# Patient Record
Sex: FEMALE | Race: WHITE | NOT HISPANIC OR LATINO | URBAN - METROPOLITAN AREA
[De-identification: names, ages, dates, MRNs, and addresses within clinical notes are randomized per-mention and may not be internally consistent; named-entity substitution may affect disease eponyms.]

---

## 2017-01-05 ENCOUNTER — OUTPATIENT (OUTPATIENT)
Dept: OUTPATIENT SERVICES | Facility: HOSPITAL | Age: 52
LOS: 1 days | Discharge: HOME | End: 2017-01-05

## 2017-06-27 DIAGNOSIS — G57.62 LESION OF PLANTAR NERVE, LEFT LOWER LIMB: ICD-10-CM

## 2017-06-27 DIAGNOSIS — M20.42 OTHER HAMMER TOE(S) (ACQUIRED), LEFT FOOT: ICD-10-CM

## 2017-10-23 ENCOUNTER — OUTPATIENT (OUTPATIENT)
Dept: OUTPATIENT SERVICES | Facility: HOSPITAL | Age: 52
LOS: 1 days | Discharge: HOME | End: 2017-10-23

## 2017-10-23 DIAGNOSIS — M79.672 PAIN IN LEFT FOOT: ICD-10-CM

## 2017-12-12 ENCOUNTER — OUTPATIENT (OUTPATIENT)
Dept: OUTPATIENT SERVICES | Facility: HOSPITAL | Age: 52
LOS: 1 days | Discharge: HOME | End: 2017-12-12

## 2017-12-12 DIAGNOSIS — Z01.818 ENCOUNTER FOR OTHER PREPROCEDURAL EXAMINATION: ICD-10-CM

## 2017-12-21 ENCOUNTER — OUTPATIENT (OUTPATIENT)
Dept: OUTPATIENT SERVICES | Facility: HOSPITAL | Age: 52
LOS: 1 days | Discharge: HOME | End: 2017-12-21

## 2017-12-21 DIAGNOSIS — M79.7 FIBROMYALGIA: ICD-10-CM

## 2017-12-21 DIAGNOSIS — M20.42 OTHER HAMMER TOE(S) (ACQUIRED), LEFT FOOT: ICD-10-CM

## 2017-12-21 DIAGNOSIS — F31.9 BIPOLAR DISORDER, UNSPECIFIED: ICD-10-CM

## 2017-12-21 DIAGNOSIS — F17.210 NICOTINE DEPENDENCE, CIGARETTES, UNCOMPLICATED: ICD-10-CM

## 2018-01-02 PROBLEM — Z00.00 ENCOUNTER FOR PREVENTIVE HEALTH EXAMINATION: Status: ACTIVE | Noted: 2018-01-02

## 2018-01-30 ENCOUNTER — OUTPATIENT (OUTPATIENT)
Dept: OUTPATIENT SERVICES | Facility: HOSPITAL | Age: 53
LOS: 1 days | Discharge: HOME | End: 2018-01-30

## 2018-01-30 DIAGNOSIS — M79.672 PAIN IN LEFT FOOT: ICD-10-CM

## 2018-01-30 DIAGNOSIS — M79.641 PAIN IN RIGHT HAND: ICD-10-CM

## 2018-03-07 ENCOUNTER — APPOINTMENT (OUTPATIENT)
Dept: PLASTIC SURGERY | Facility: CLINIC | Age: 53
End: 2018-03-07

## 2019-06-03 ENCOUNTER — EMERGENCY (EMERGENCY)
Facility: HOSPITAL | Age: 54
LOS: 0 days | Discharge: HOME | End: 2019-06-04
Attending: EMERGENCY MEDICINE | Admitting: EMERGENCY MEDICINE
Payer: MEDICAID

## 2019-06-03 VITALS
HEART RATE: 82 BPM | DIASTOLIC BLOOD PRESSURE: 64 MMHG | OXYGEN SATURATION: 98 % | SYSTOLIC BLOOD PRESSURE: 130 MMHG | RESPIRATION RATE: 18 BRPM | WEIGHT: 154.98 LBS | HEIGHT: 67 IN | TEMPERATURE: 96 F

## 2019-06-03 DIAGNOSIS — M77.9 ENTHESOPATHY, UNSPECIFIED: ICD-10-CM

## 2019-06-03 DIAGNOSIS — M79.646 PAIN IN UNSPECIFIED FINGER(S): ICD-10-CM

## 2019-06-03 PROCEDURE — 73130 X-RAY EXAM OF HAND: CPT | Mod: 26,RT

## 2019-06-03 PROCEDURE — 99283 EMERGENCY DEPT VISIT LOW MDM: CPT | Mod: 25
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
## 2019-06-03 NOTE — ED ADULT TRIAGE NOTE - BP NONINVASIVE DIASTOLIC (MM HG)
patietn upset because he is in pain and has been waiting patiently.  Walked out but brought back by hospital security services.  CT called and ready for patient.  Will treat with pain meds.
66

## 2019-06-04 NOTE — ED PROVIDER NOTE - CLINICAL SUMMARY MEDICAL DECISION MAKING FREE TEXT BOX
54 female here for  right index digit pain had screening exam and labs, no acute findings, will discharge with outpatient management.

## 2019-06-04 NOTE — ED PROVIDER NOTE - PHYSICAL EXAMINATION
CONST: Well appearing in NAD  EYES: PERRL, EOMI, Sclera and conjunctiva clear.   ENT: No nasal discharge. Oropharynx normal appearing, no erythema or exudates. No abscess or swelling. Uvula midline.   NECK: Non-tender, no meningeal signs. normal ROM. supple   CARD: S1 S2; No jvd  RESP: Equal BS B/L, No wheezes, rhonchi or rales. No distress  GI: Soft, non-tender, non-distended. no cva tenderness. normal BS  MS: R 2nd and 3rd MCP tenderness and swelling. Normal ROM in all extremities. pulses 2 +.   SKIN: Warm, dry, no acute rashes. Good turgor  NEURO: A&Ox3, No focal deficits. Strength 5/5 with no sensory deficits.

## 2019-06-04 NOTE — ED PROVIDER NOTE - NS ED ROS FT
Constitutional: (-) fever  Eyes/ENT: (-) blurry vision, (-) epistaxis  Cardiovascular: (-) chest pain, (-) syncope  Respiratory: (-) cough, (-) shortness of breath  Gastrointestinal: (-) vomiting, (-) diarrhea  Musculoskeletal: (+) joint pain, (-) neck pain, (-) back pain  Integumentary: (-) rash, (-) edema  Neurological: (-) headache, (-) altered mental status  Psychiatric: (-) hallucinations  Allergic/Immunologic: (-) pruritus

## 2019-06-04 NOTE — ED PROVIDER NOTE - PROGRESS NOTE DETAILS
Results d/w patient and family and copies of results provided.  Pt instructed to return if any worsening symptoms or concerns.  They verbalize understanding. I have personally evaluated the patient myself and agree with fellow's plan.
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
## 2019-10-06 ENCOUNTER — EMERGENCY (EMERGENCY)
Facility: HOSPITAL | Age: 54
LOS: 0 days | Discharge: HOME | End: 2019-10-06
Attending: EMERGENCY MEDICINE | Admitting: EMERGENCY MEDICINE
Payer: MEDICAID

## 2019-10-06 VITALS
DIASTOLIC BLOOD PRESSURE: 80 MMHG | TEMPERATURE: 96 F | RESPIRATION RATE: 18 BRPM | HEART RATE: 71 BPM | SYSTOLIC BLOOD PRESSURE: 137 MMHG | OXYGEN SATURATION: 99 %

## 2019-10-06 VITALS — HEIGHT: 67 IN | WEIGHT: 160.06 LBS

## 2019-10-06 DIAGNOSIS — R21 RASH AND OTHER NONSPECIFIC SKIN ERUPTION: ICD-10-CM

## 2019-10-06 DIAGNOSIS — B02.9 ZOSTER WITHOUT COMPLICATIONS: ICD-10-CM

## 2019-10-06 DIAGNOSIS — Z98.890 OTHER SPECIFIED POSTPROCEDURAL STATES: Chronic | ICD-10-CM

## 2019-10-06 LAB
ANION GAP SERPL CALC-SCNC: 11 MMOL/L — SIGNIFICANT CHANGE UP (ref 7–14)
BUN SERPL-MCNC: 16 MG/DL — SIGNIFICANT CHANGE UP (ref 10–20)
CALCIUM SERPL-MCNC: 9.4 MG/DL — SIGNIFICANT CHANGE UP (ref 8.5–10.1)
CHLORIDE SERPL-SCNC: 104 MMOL/L — SIGNIFICANT CHANGE UP (ref 98–110)
CO2 SERPL-SCNC: 24 MMOL/L — SIGNIFICANT CHANGE UP (ref 17–32)
CREAT SERPL-MCNC: 1 MG/DL — SIGNIFICANT CHANGE UP (ref 0.7–1.5)
GLUCOSE SERPL-MCNC: 93 MG/DL — SIGNIFICANT CHANGE UP (ref 70–99)
POTASSIUM SERPL-MCNC: 4.6 MMOL/L — SIGNIFICANT CHANGE UP (ref 3.5–5)
POTASSIUM SERPL-SCNC: 4.6 MMOL/L — SIGNIFICANT CHANGE UP (ref 3.5–5)
SODIUM SERPL-SCNC: 139 MMOL/L — SIGNIFICANT CHANGE UP (ref 135–146)

## 2019-10-06 PROCEDURE — 99284 EMERGENCY DEPT VISIT MOD MDM: CPT

## 2019-10-06 RX ORDER — VALACYCLOVIR 500 MG/1
1 TABLET, FILM COATED ORAL
Qty: 30 | Refills: 0
Start: 2019-10-06 | End: 2019-10-15

## 2019-10-06 NOTE — ED PROVIDER NOTE - PHYSICAL EXAMINATION
Gen: NAD  Head: NCAT  HEENT: PERRL, oral mucosa moist, normal conjunctiva, oropharynx clear without exudate or erythema  Lung: CTAB, no respiratory distress, no wheezing, rales, rhonchi  CV: normal s1/s2, rrr, Normal perfusion, pulses 2+ throughout  Abd: soft, NTND, no CVA tenderness  Genitourinary: no pelvic tenderness  MSK: No edema, no visible deformities, full range of motion in all 4 extremities  Neuro: AOx3  Skin: multiple 3-4cm circular erythematous blanchable papulae running along L T12 dermatome    Psych: normal affect

## 2019-10-06 NOTE — ED PROVIDER NOTE - NSFOLLOWUPINSTRUCTIONS_ED_ALL_ED_FT
Please follow up with your primary care physician within 24-72 hours and return immediately if symptoms worsen.    SHINGLES    Shingles, which is also known as herpes zoster, is an infection that causes a painful skin rash and fluid-filled blisters. Shingles is not related to genital herpes, which is a sexually transmitted infection.     Shingles only develops in people who:    Have had chickenpox.  Have received the chickenpox vaccine. (This is rare.)    CAUSES  Shingles is caused by varicella-zoster virus (VZV). This is the same virus that causes chickenpox. After exposure to VZV, the virus stays in the body in an inactive (dormant) state. Shingles develops if the virus reactivates. This can happen many years after the initial exposure to VZV. It is not known what causes this virus to reactivate.    RISK FACTORS  People who have had chickenpox or received the chickenpox vaccine are at risk for shingles. Infection is more common in people who:    Are older than age 50.  Have a weakened defense (immune) system, such as those with HIV, AIDS, or cancer.  Are taking medicines that weaken the immune system, such as transplant medicines.  Are under great stress.    SYMPTOMS  Early symptoms of this condition include itching, tingling, and pain in an area on your skin. Pain may be described as burning, stabbing, or throbbing.    A few days or weeks after symptoms start, a painful red rash appears, usually on one side of the body in a bandlike or beltlike pattern. The rash eventually turns into fluid-filled blisters that break open, scab over, and dry up in about 2–3 weeks.    At any time during the infection, you may also develop:    A fever.  Chills.  A headache.  An upset stomach.    DIAGNOSIS  This condition is diagnosed with a skin exam. Sometimes, skin or fluid samples are taken from the blisters before a diagnosis is made. These samples are examined under a microscope or sent to a lab for testing.    TREATMENT  There is no specific cure for this condition. Your health care provider will probably prescribe medicines to help you manage pain, recover more quickly, and avoid long-term problems. Medicines may include:    Antiviral drugs.  Anti-inflammatory drugs.  Pain medicines.    If the area involved is on your face, you may be referred to a specialist, such as an eye doctor (ophthalmologist) or an ear, nose, and throat (ENT) doctor to help you avoid eye problems, chronic pain, or disability.    HOME CARE INSTRUCTIONS  Medicines    Take medicines only as directed by your health care provider.  Apply an anti-itch or numbing cream to the affected area as directed by your health care provider.    Blister and Rash Care    Take a cool bath or apply cool compresses to the area of the rash or blisters as directed by your health care provider. This may help with pain and itching.  Keep your rash covered with a loose bandage (dressing). Wear loose-fitting clothing to help ease the pain of material rubbing against the rash.  Keep your rash and blisters clean with mild soap and cool water or as directed by your health care provider.  Check your rash every day for signs of infection. These include redness, swelling, and pain that lasts or increases.  Do not pick your blisters.  Do not scratch your rash.    General Instructions    Rest as directed by your health care provider.  Keep all follow-up visits as directed by your health care provider. This is important.  Until your blisters scab over, your infection can cause chickenpox in people who have never had it or been vaccinated against it. To prevent this from happening, avoid contact with other people, especially:  Babies.  Pregnant women.  Children who have eczema.  Elderly people who have transplants.  People who have chronic illnesses, such as leukemia or AIDS.    SEEK MEDICAL CARE IF:  Your pain is not relieved with prescribed medicines.  Your pain does not get better after the rash heals.  Your rash looks infected. Signs of infection include redness, swelling, and pain that lasts or increases.    SEEK IMMEDIATE MEDICAL CARE IF:  The rash is on your face or nose.  You have facial pain, pain around your eye area, or loss of feeling on one side of your face.  You have ear pain or you have ringing in your ear.  You have loss of taste.  Your condition gets worse.    ADDITIONAL NOTES AND INSTRUCTIONS    Please follow up with your Primary MD in 24-48 hr.  Seek immediate medical care for any new/worsening signs or symptoms.

## 2019-10-06 NOTE — ED PROVIDER NOTE - PATIENT PORTAL LINK FT
You can access the FollowMyHealth Patient Portal offered by [facility] by registering at the following website: [email or web address]. By joining [employer]’s FollowMyHealth portal, you will also be able to view your health information using other applications (apps) compatible with our system.

## 2019-10-06 NOTE — ED ADULT NURSE NOTE - NSIMPLEMENTINTERV_GEN_ALL_ED
Implemented All Universal Safety Interventions:  [place] to call system. Call bell, personal items and telephone within reach. Instruct patient to call for assistance. Room bathroom lighting operational. Non-slip footwear when patient is off stretcher. Physically safe environment: no spills, clutter or unnecessary equipment. Stretcher in lowest position, wheels locked, appropriate side rails in place.

## 2019-10-06 NOTE — ED PROVIDER NOTE - ATTENDING CONTRIBUTION TO CARE
55 yo female with no sig pmhx here for eval of rash. pt c/o left back rash that is painfull. no fever, chills, insect bite. on exam pt with raised papular rash to left flank/back in different stages, doesn't cross midline. lesions are not warm or red.

## 2019-10-06 NOTE — ED PROVIDER NOTE - OBJECTIVE STATEMENT
55 yo female with no pertinent pmh presents with rash to her L side. she noted the rash when she woke this morning and states it to be pruritic and painful. no one else in her house has a similar rash and she does note to changed detergent recently. she has felt more tired than usual but denies any other symptoms including fevers, chill, headache, recent illness/travel, cough, abdominal pain, chest pain, or SOB.
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
## 2020-01-28 ENCOUNTER — EMERGENCY (EMERGENCY)
Facility: HOSPITAL | Age: 55
LOS: 0 days | Discharge: HOME | End: 2020-01-28
Attending: EMERGENCY MEDICINE | Admitting: EMERGENCY MEDICINE
Payer: MEDICAID

## 2020-01-28 VITALS
DIASTOLIC BLOOD PRESSURE: 82 MMHG | OXYGEN SATURATION: 95 % | SYSTOLIC BLOOD PRESSURE: 129 MMHG | HEART RATE: 82 BPM | WEIGHT: 169.98 LBS | TEMPERATURE: 96 F | RESPIRATION RATE: 16 BRPM

## 2020-01-28 DIAGNOSIS — Z98.890 OTHER SPECIFIED POSTPROCEDURAL STATES: ICD-10-CM

## 2020-01-28 DIAGNOSIS — J32.9 CHRONIC SINUSITIS, UNSPECIFIED: ICD-10-CM

## 2020-01-28 DIAGNOSIS — Z98.890 OTHER SPECIFIED POSTPROCEDURAL STATES: Chronic | ICD-10-CM

## 2020-01-28 DIAGNOSIS — F17.210 NICOTINE DEPENDENCE, CIGARETTES, UNCOMPLICATED: ICD-10-CM

## 2020-01-28 DIAGNOSIS — R05 COUGH: ICD-10-CM

## 2020-01-28 PROBLEM — F31.9 BIPOLAR DISORDER, UNSPECIFIED: Chronic | Status: ACTIVE | Noted: 2019-10-06

## 2020-01-28 PROBLEM — F90.9 ATTENTION-DEFICIT HYPERACTIVITY DISORDER, UNSPECIFIED TYPE: Chronic | Status: ACTIVE | Noted: 2019-10-06

## 2020-01-28 PROCEDURE — 71046 X-RAY EXAM CHEST 2 VIEWS: CPT | Mod: 26

## 2020-01-28 PROCEDURE — 99285 EMERGENCY DEPT VISIT HI MDM: CPT

## 2020-01-28 PROCEDURE — 71045 X-RAY EXAM CHEST 1 VIEW: CPT | Mod: 26,59

## 2020-01-28 RX ORDER — IPRATROPIUM/ALBUTEROL SULFATE 18-103MCG
3 AEROSOL WITH ADAPTER (GRAM) INHALATION ONCE
Refills: 0 | Status: COMPLETED | OUTPATIENT
Start: 2020-01-28 | End: 2020-01-28

## 2020-01-28 RX ORDER — DEXAMETHASONE 0.5 MG/5ML
10 ELIXIR ORAL ONCE
Refills: 0 | Status: COMPLETED | OUTPATIENT
Start: 2020-01-28 | End: 2020-01-28

## 2020-01-28 RX ADMIN — Medication 10 MILLIGRAM(S): at 10:12

## 2020-01-28 RX ADMIN — Medication 3 MILLILITER(S): at 11:02

## 2020-01-28 RX ADMIN — Medication 3 MILLILITER(S): at 10:16

## 2020-01-28 RX ADMIN — Medication 3 MILLILITER(S): at 10:12

## 2020-01-28 NOTE — ED PROVIDER NOTE - OBJECTIVE STATEMENT
Patient is a 53 yo f who presents for evaluation of worsening cough, could congestion over the past several days she denies any other headache, neck pain, chest pain or exertional sob, she denies any diaphoresis.  She denies any vomiting.  she is a smoker.

## 2020-01-28 NOTE — ED PROVIDER NOTE - ATTENDING CONTRIBUTION TO CARE
I was present for and supervised the key and critical aspects of the procedures performed during the care of the patient.  Patient is a 53 yo f who presents for evaluation of worsening cough, could congestion over the past several days she denies any other headache, neck pain, chest pain or exertional sob, she denies any diaphoresis.  She denies any vomiting.  she is a smoker.    On physical exam the patient is nc/at perrla eomi oropharynx clear cta b/l, rrr s1s2 noted ext  from   patient given albuterol, we initiated po antibiotics.

## 2020-01-28 NOTE — ED PROVIDER NOTE - PATIENT PORTAL LINK FT
You can access the FollowMyHealth Patient Portal offered by [facility] by registering at the following website: [email or web address]. By joining [employer]’s FollowMyHealth portal, you will also be able to view your health information using other applications (apps) compatible with our system.

## 2020-01-28 NOTE — ED ADULT TRIAGE NOTE - CHIEF COMPLAINT QUOTE
"I had the flu eight days ago. Since then my sinuses have been bothering me. I have a cough with green mucous. I have an ache to my upper back and my glands are swollen as well."

## 2020-01-28 NOTE — ED PROVIDER NOTE - CLINICAL SUMMARY MEDICAL DECISION MAKING FREE TEXT BOX
Patient has improved at this time I will discharge we initiated po antibiotics we discussed indications to return at this time

## 2020-03-11 NOTE — ED PROVIDER NOTE - NS ED ROS FT
Constitutional: (-) fever  Eyes/ENT: (-) blurry vision, (-) epistaxis, (-) visual changes   Cardiovascular: (-) chest pain, (-) syncope  Respiratory: (-) cough, (-) shortness of breath  Gastrointestinal: (-) vomiting, (-) diarrhea  Genitourinary: (-) dysuria, (-) hesitancy, (-) frequency   Musculoskeletal: (-) neck pain, (-) back pain, (-) joint pain  Integumentary: (+) rash, (-) edema  Neurological: (-) headache, (-) altered mental status  Allergic/Immunologic: (-) pruritus clear

## 2021-04-06 ENCOUNTER — APPOINTMENT (OUTPATIENT)
Dept: PLASTIC SURGERY | Facility: CLINIC | Age: 56
End: 2021-04-06
Payer: COMMERCIAL

## 2021-04-06 DIAGNOSIS — Z87.891 PERSONAL HISTORY OF NICOTINE DEPENDENCE: ICD-10-CM

## 2021-04-06 PROCEDURE — 99072 ADDL SUPL MATRL&STAF TM PHE: CPT

## 2021-04-06 RX ORDER — MENTHOL/CAMPHOR 0.5 %-0.5%
1000 LOTION (ML) TOPICAL
Refills: 0 | Status: ACTIVE | COMMUNITY

## 2021-04-06 RX ORDER — DEXTROAMPHETAMINE SACCHARATE, AMPHETAMINE ASPARTATE, DEXTROAMPHETAMINE SULFATE, AND AMPHETAMINE SULFATE 2.5; 2.5; 2.5; 2.5 MG/1; MG/1; MG/1; MG/1
10 TABLET ORAL
Refills: 0 | Status: ACTIVE | COMMUNITY

## 2021-04-06 RX ORDER — MULTIVIT-MIN/IRON/FOLIC ACID/K 18-600-40
CAPSULE ORAL
Refills: 0 | Status: ACTIVE | COMMUNITY

## 2021-04-06 RX ORDER — LISDEXAMFETAMINE DIMESYLATE 10 MG/1
CAPSULE ORAL
Refills: 0 | Status: ACTIVE | COMMUNITY

## 2021-04-06 RX ORDER — IBUPROFEN 800 MG/1
800 TABLET, FILM COATED ORAL
Refills: 0 | Status: ACTIVE | COMMUNITY

## 2021-04-06 RX ORDER — HYDROCORTISONE ACETATE 0.5 %
CREAM (GRAM) TOPICAL
Refills: 0 | Status: ACTIVE | COMMUNITY

## 2021-04-06 RX ORDER — BUPROPION HYDROCHLORIDE 75 MG/1
TABLET, FILM COATED ORAL
Refills: 0 | Status: ACTIVE | COMMUNITY

## 2022-04-26 ENCOUNTER — APPOINTMENT (OUTPATIENT)
Dept: PLASTIC SURGERY | Facility: CLINIC | Age: 57
End: 2022-04-26
Payer: MEDICAID

## 2022-04-26 PROCEDURE — 99203 OFFICE O/P NEW LOW 30 MIN: CPT

## 2022-04-26 NOTE — HISTORY OF PRESENT ILLNESS
[FreeTextEntry1] : 56 yr old woman nonsmoker nondiabetic\par \par prior TUBA approx 13 yrs ago (as well as prior TUBA also approx 13 yrs ago) by [facility]\par has deflation of right breast implant first noticed approx one month prior\par \par no personal h/o breast dz\par last mammogram 2021--normal at [facility]\par \par 5'7"  160lbs\par wears 34DD bra\par \par desires breast lift, slight downsize, silicon implants

## 2022-04-26 NOTE — PHYSICAL EXAM
[NI] : Normal [Bra Size: _______] : Bra Size: [unfilled] [de-identified] : no masses, prepectoral saline implants w partial deflation on the right grade2/3 ptosis\par breast measurements:  left sternal notch-left nipple 27cm     right sternal notch-right nipple 27.5cm\par IMF-left nipple  10cm      IMF-right nipple 9cm      breast width right 15cm    breast width left 16.5cm

## 2022-04-26 NOTE — ASSESSMENT
[FreeTextEntry1] : Photos taken with patient permission.\par \par Due to COVID 19, pre-visit patient instructions were explained to the patient and their symptoms were checked upon arrival.  \par Masks were used by the health care providers and staff and the examination room was cleaned after the patient visit was completed.\par \par Regarding breast implants, we discussed saline versus silicone implants.  We discussed incision approach and the pros/cons of each incision.  We discussed risk of bleeding, infection, capsular contracture, implant failure, need for additional planned or unplanned surgery on breast, as well as the small risk of implant associated lymphoma.  We discussed the likelihood of additional surgery on the breast over the course of her lifetime.  All ?s answered.\par \par Patient is a good candidate for concurrent breast lift.  Regarding the procedure, we discussed scarring, poor wound healing, keloid and/or hypertrophic scarring, diminished nipple sensation, diminished ability to breast feed (if appropriate), breast asymmetry, dissatisfaction with the outcome, need for additional surgery and possible nipple loss.  All questions were answered and all risks were understood.\par \par Due to COVID 19, pre-visit patient instructions were explained to the patient and their symptoms were checked upon arrival.  \par Masks were used by the health care providers and staff and the examination room was cleaned after the patient visit was completed.\par \par \par PT to provide old op note--she believes 550cc saline implants

## 2022-08-25 ENCOUNTER — EMERGENCY (EMERGENCY)
Facility: HOSPITAL | Age: 57
LOS: 0 days | Discharge: HOME | End: 2022-08-25
Attending: EMERGENCY MEDICINE | Admitting: EMERGENCY MEDICINE

## 2022-08-25 VITALS
HEART RATE: 81 BPM | OXYGEN SATURATION: 99 % | SYSTOLIC BLOOD PRESSURE: 129 MMHG | DIASTOLIC BLOOD PRESSURE: 73 MMHG | HEIGHT: 67 IN | WEIGHT: 149.91 LBS | RESPIRATION RATE: 18 BRPM | TEMPERATURE: 97 F

## 2022-08-25 DIAGNOSIS — Z98.890 OTHER SPECIFIED POSTPROCEDURAL STATES: Chronic | ICD-10-CM

## 2022-08-25 DIAGNOSIS — Z87.39 PERSONAL HISTORY OF OTHER DISEASES OF THE MUSCULOSKELETAL SYSTEM AND CONNECTIVE TISSUE: ICD-10-CM

## 2022-08-25 DIAGNOSIS — S09.90XA UNSPECIFIED INJURY OF HEAD, INITIAL ENCOUNTER: ICD-10-CM

## 2022-08-25 DIAGNOSIS — S42.291A OTHER DISPLACED FRACTURE OF UPPER END OF RIGHT HUMERUS, INITIAL ENCOUNTER FOR CLOSED FRACTURE: ICD-10-CM
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
## 2022-08-25 DIAGNOSIS — M25.511 PAIN IN RIGHT SHOULDER: ICD-10-CM

## 2022-08-25 DIAGNOSIS — F31.9 BIPOLAR DISORDER, UNSPECIFIED: ICD-10-CM

## 2022-08-25 DIAGNOSIS — R22.0 LOCALIZED SWELLING, MASS AND LUMP, HEAD: ICD-10-CM

## 2022-08-25 DIAGNOSIS — S00.83XA CONTUSION OF OTHER PART OF HEAD, INITIAL ENCOUNTER: ICD-10-CM

## 2022-08-25 DIAGNOSIS — F90.9 ATTENTION-DEFICIT HYPERACTIVITY DISORDER, UNSPECIFIED TYPE: ICD-10-CM

## 2022-08-25 DIAGNOSIS — Z87.891 PERSONAL HISTORY OF NICOTINE DEPENDENCE: ICD-10-CM

## 2022-08-25 PROCEDURE — 70450 CT HEAD/BRAIN W/O DYE: CPT | Mod: 26,MA

## 2022-08-25 PROCEDURE — 99284 EMERGENCY DEPT VISIT MOD MDM: CPT

## 2022-08-25 PROCEDURE — 73030 X-RAY EXAM OF SHOULDER: CPT | Mod: 26,RT

## 2022-08-25 RX ORDER — OXYCODONE AND ACETAMINOPHEN 5; 325 MG/1; MG/1
1 TABLET ORAL ONCE
Refills: 0 | Status: DISCONTINUED | OUTPATIENT
Start: 2022-08-25 | End: 2022-08-25

## 2022-08-25 RX ADMIN — OXYCODONE AND ACETAMINOPHEN 1 TABLET(S): 5; 325 TABLET ORAL at 22:08

## 2022-08-25 NOTE — ED PROVIDER NOTE - NSFOLLOWUPINSTRUCTIONS_ED_ALL_ED_FT
You were evaluated for a fall.  You had a CT scan which showed no injury to the brain.  He had an x-ray which showed a proximal humeral head fracture.  You were placed in a sling.  You should follow-up with the orthopedic doctor.  General healing will take 4 to 6 weeks.  If you develop worsening pain, swelling, vomiting, changes in mental status, numbness, or paresthesias in the arm he should return to the ED or follow-up with your primary care doctor

## 2022-08-25 NOTE — ED PROVIDER NOTE - NS ED ATTENDING STATEMENT MOD
This was a shared visit with the ARIAN. I reviewed and verified the documentation and independently performed the documented:

## 2022-08-25 NOTE — ED PROVIDER NOTE - CARE PLAN
1 Principal Discharge DX:	Closed head injury  Secondary Diagnosis:	Traumatic injury of shoulder with fracture of humerus

## 2022-08-25 NOTE — ED PROVIDER NOTE - OBJECTIVE STATEMENT
56 yo female hx of ADHD/Bipolar present c/o right shoulder present complaint of right shoulder pain and swelling and right forehead swelling status post fall during roller skating.  Reports she fell into a concrete wall doing roller skating.  Pain to right shoulder wrist with movement.  Denies LOC.  Denies neck and lower back pain.  Further denies chest pain, shortness of breath, abdominal pain, nausea and vomiting.  Denies other extremity pains also.

## 2022-08-25 NOTE — ED PROVIDER NOTE - CLINICAL SUMMARY MEDICAL DECISION MAKING FREE TEXT BOX
Patient evaluated for fall.  CT scan obtained which was negative for acute intracranial pathology.  X-ray was obtained which showed a proximal humeral head fracture on the right.  Patient was placed in a sling with follow-up with Ortho as an outpatient

## 2022-08-25 NOTE — ED PROVIDER NOTE - PATIENT PORTAL LINK FT
You can access the FollowMyHealth Patient Portal offered by [facility] by registering at the following website: [email or web address]. By joining [employer]’s FollowMyHealth portal, you will also be able to view your health information using other applications (apps) compatible with our system.

## 2022-08-25 NOTE — ED PROVIDER NOTE - CARE PROVIDER_API CALL
Saul Velasco)  [facility]  [address] [place] [address] [place] [address]  Phone: [phone]  Fax: [phone]  Follow Up Time: 7-10 Days

## 2022-08-25 NOTE — ED PROVIDER NOTE - ATTENDING APP SHARED VISIT CONTRIBUTION OF CARE
Patient was rollerskating fell and had a head injury did not lose consciousness no vomiting occurred 45 minutes prior to arrival also injured her right shoulder pain is mainly in the right shoulder with difficulty with movement sharp in nature moderate intensity without any numbness or paresthesias on exam there is a large hematoma to the right frontal scalp pupils are normal cranial nerves are intact right shoulder limited range of motion secondary to pain but distal extremity exam is normal plan will be x-ray of the shoulder and CT head

## 2022-08-25 NOTE — ED PROVIDER NOTE - NS ED ROS FT
Constitutional: no fever, chills, no recent weight loss, change in appetite or malaise  Cardiac: No chest pain, SOB or edema.  Respiratory: No cough or respiratory distress  GI: No nausea, vomiting, diarrhea or abdominal pain.  MS: See HPI  Neuro: No headache or weakness. No LOC.  Skin: See HPI  Endocrine: No history of thyroid disease or diabetes.

## 2022-09-01 ENCOUNTER — APPOINTMENT (OUTPATIENT)
Dept: ORTHOPEDIC SURGERY | Facility: CLINIC | Age: 57
End: 2022-09-01

## 2022-09-01 VITALS — BODY MASS INDEX: 23.54 KG/M2 | WEIGHT: 150 LBS | HEIGHT: 67 IN

## 2022-09-01 DIAGNOSIS — Z56.0 UNEMPLOYMENT, UNSPECIFIED: ICD-10-CM

## 2022-09-01 DIAGNOSIS — F17.200 NICOTINE DEPENDENCE, UNSPECIFIED, UNCOMPLICATED: ICD-10-CM

## 2022-09-01 PROCEDURE — 23600 CLTX PROX HUMRL FX W/O MNPJ: CPT

## 2022-09-01 PROCEDURE — 99203 OFFICE O/P NEW LOW 30 MIN: CPT | Mod: 25,57

## 2022-09-01 SDOH — ECONOMIC STABILITY - INCOME SECURITY: UNEMPLOYMENT, UNSPECIFIED: Z56.0

## 2022-09-01 NOTE — IMAGING
[de-identified] :   Middle-aged woman sits somewhat uncomfortably in my office.  She is annoyed at overweight time but does not appear to be in distress.\par \par Physical examination:\par Right shoulder and upper extremity:  Ecchymosis noted along the proximal medial aspect of her upper arm.  She has no tenderness palpation about the clavicle or acromial arch.  Some tenderness with associated swelling at the level of her fracture.  She is able to fire her deltoid and has normal light sensation axillary nerve distribution.  She is able range her wrist and digits without limitation although she has some dependent edema.  Normal light sensation about her fingers.\par \par Radiographs from [facility] reviewed from August 25th 2022. These demonstrate multi fragmentary comminuted right proximal humerus fracture.

## 2022-09-01 NOTE — HISTORY OF PRESENT ILLNESS
[de-identified] :  57-year-old woman presents to this office for evaluation of a multi fragmentary right proximal humerus fracture sustained as result of a fall on August 21, 2022. Initially seen at [facility] patient was referred to my office for further evaluation.  Patient has no prior history of problems with her right shoulder.  She reports swelling ecchymosis and pain associated with the fracture.  She would like something stronger than ibuprofen for pain relief.  She does also notes some mild GI symptoms associated with her ibuprofen.  She denies any clear sensory complaints.

## 2022-09-01 NOTE — ASSESSMENT
[FreeTextEntry1] :   57-year-old woman presents for initial evaluation of a comminuted right proximal humerus fracture.  I have discussed with her treatment options which include continued non operative management, surgical intervention which bifurcated since 2 either choice for ORIF versus arthroplasty.  The patient is not at all interested in any surgery.  Consequently would not recommend further advanced imaging.  Instead within non operative management strategy I would like to continue utilizing his sling and can start pendulum exercises elbow range of motion exercises pain subsides.  We also talked about pain management.  I am uncomfortable revising with a large dosage of narcotic medication.  We talked about NSAIDs and I offered write her prescription for more NSAIDs with famotidine to minimize GI side effects.  We talked about at additional concurrent use the Tylenol to help with the pain.  I offered a smaller limited dose of narcotics which she has declined.  Instead she would prefer to see get further evaluation by another orthopedist.  I provided the name of several orthopedists.  Given the phone number of the office for Dr. Chris Thapa her request.  In the event that she has difficulty following up with Dr. Thapa or another orthopedist I am going to give her a follow-up appointment in 2 weeks.  The plan.

## 2022-09-02 RX ORDER — FAMOTIDINE 20 MG/1
20 TABLET, FILM COATED ORAL
Qty: 60 | Refills: 0 | Status: ACTIVE | COMMUNITY
Start: 2022-09-02 | End: 1900-01-01

## 2022-09-15 ENCOUNTER — APPOINTMENT (OUTPATIENT)
Dept: ORTHOPEDIC SURGERY | Facility: CLINIC | Age: 57
End: 2022-09-15

## 2022-09-22 ENCOUNTER — APPOINTMENT (OUTPATIENT)
Dept: ORTHOPEDIC SURGERY | Facility: CLINIC | Age: 57
End: 2022-09-22

## 2022-09-22 NOTE — ASSESSMENT
[FreeTextEntry1] :   57-year-old woman status post fall resulting comminuted right proximal humerus fracture 4 weeks ago.  Radiographs continue to demonstrate a profoundly displaced right proximal humerus fracture with comminution.  My recommendations remain that without surgical intervention she is likely to have significant restriction in shoulder motion.  It is possible that I could repair of fracture but without a CT scan I can't tell her the likelihood that I would be successful.  If I were unable to repair it she would require some sort of arthroplasty.  In light of her young age I would probably try for a partial hemiarthroplasty.  If this failed but we were able to get the tuberosities heel she can be converted to a standard shoulder replacement if her function worse insufficient.  Patient is amenable to surgery.  We will make arrangements for next week.  CT scan essential prior to surgery.

## 2022-09-22 NOTE — HISTORY OF PRESENT ILLNESS
[de-identified] :  57-year-old woman returns for interval follow-up of a right proximal humerus fracture sustained as a result of a ground level fall on August 21, 2022. When I last saw her on September 1st we talked about treatment options and she elected for nonsurgical management.  She notes the pain is improved with ibuprofen and Tylenol.  She denies any sensory complaints.  She reports that ecchymosis has largely resolved and the swelling is improved.  She has not significantly participate in elbow or shoulder motion exercises because of pain but notes the pain has improved.

## 2022-10-04 ENCOUNTER — RX RENEWAL (OUTPATIENT)
Age: 57
End: 2022-10-04

## 2022-10-10 ENCOUNTER — OUTPATIENT (OUTPATIENT)
Dept: OUTPATIENT SERVICES | Facility: HOSPITAL | Age: 57
LOS: 1 days | Discharge: HOME | End: 2022-10-10

## 2022-10-10 VITALS
HEART RATE: 83 BPM | WEIGHT: 149.91 LBS | DIASTOLIC BLOOD PRESSURE: 74 MMHG | HEIGHT: 67 IN | OXYGEN SATURATION: 100 % | SYSTOLIC BLOOD PRESSURE: 131 MMHG | RESPIRATION RATE: 20 BRPM | TEMPERATURE: 97 F

## 2022-10-10 DIAGNOSIS — S42.291D OTHER DISPLACED FRACTURE OF UPPER END OF RIGHT HUMERUS, SUBSEQUENT ENCOUNTER FOR FRACTURE WITH ROUTINE HEALING: ICD-10-CM

## 2022-10-10 DIAGNOSIS — Z98.890 OTHER SPECIFIED POSTPROCEDURAL STATES: Chronic | ICD-10-CM

## 2022-10-10 DIAGNOSIS — Z01.818 ENCOUNTER FOR OTHER PREPROCEDURAL EXAMINATION: ICD-10-CM

## 2022-10-10 LAB
ALBUMIN SERPL ELPH-MCNC: 4.9 G/DL — SIGNIFICANT CHANGE UP (ref 3.5–5.2)
ALP SERPL-CCNC: 111 U/L — SIGNIFICANT CHANGE UP (ref 30–115)
ALT FLD-CCNC: 23 U/L — SIGNIFICANT CHANGE UP (ref 0–41)
ANION GAP SERPL CALC-SCNC: 14 MMOL/L — SIGNIFICANT CHANGE UP (ref 7–14)
APPEARANCE UR: ABNORMAL
APTT BLD: 30.9 SEC — SIGNIFICANT CHANGE UP (ref 27–39.2)
AST SERPL-CCNC: 29 U/L — SIGNIFICANT CHANGE UP (ref 0–41)
BACTERIA # UR AUTO: ABNORMAL
BASOPHILS # BLD AUTO: 0.05 K/UL — SIGNIFICANT CHANGE UP (ref 0–0.2)
BASOPHILS NFR BLD AUTO: 0.8 % — SIGNIFICANT CHANGE UP (ref 0–1)
BILIRUB SERPL-MCNC: 0.3 MG/DL — SIGNIFICANT CHANGE UP (ref 0.2–1.2)
BILIRUB UR-MCNC: ABNORMAL
BUN SERPL-MCNC: 15 MG/DL — SIGNIFICANT CHANGE UP (ref 10–20)
CALCIUM SERPL-MCNC: 9.7 MG/DL — SIGNIFICANT CHANGE UP (ref 8.4–10.5)
CHLORIDE SERPL-SCNC: 99 MMOL/L — SIGNIFICANT CHANGE UP (ref 98–110)
CO2 SERPL-SCNC: 26 MMOL/L — SIGNIFICANT CHANGE UP (ref 17–32)
COLOR SPEC: ABNORMAL
CREAT SERPL-MCNC: 1.1 MG/DL — SIGNIFICANT CHANGE UP (ref 0.7–1.5)
DIFF PNL FLD: NEGATIVE — SIGNIFICANT CHANGE UP
EGFR: 59 ML/MIN/1.73M2 — LOW
EOSINOPHIL # BLD AUTO: 0.38 K/UL — SIGNIFICANT CHANGE UP (ref 0–0.7)
EOSINOPHIL NFR BLD AUTO: 5.7 % — SIGNIFICANT CHANGE UP (ref 0–8)
EPI CELLS # UR: 22 /HPF — HIGH (ref 0–5)
GLUCOSE SERPL-MCNC: 78 MG/DL — SIGNIFICANT CHANGE UP (ref 70–99)
GLUCOSE UR QL: NEGATIVE — SIGNIFICANT CHANGE UP
HCT VFR BLD CALC: 42.1 % — SIGNIFICANT CHANGE UP (ref 37–47)
HGB BLD-MCNC: 14.6 G/DL — SIGNIFICANT CHANGE UP (ref 12–16)
HYALINE CASTS # UR AUTO: 16 /LPF — HIGH (ref 0–7)
IMM GRANULOCYTES NFR BLD AUTO: 0.2 % — SIGNIFICANT CHANGE UP (ref 0.1–0.3)
INR BLD: 0.85 RATIO — SIGNIFICANT CHANGE UP (ref 0.65–1.3)
KETONES UR-MCNC: SIGNIFICANT CHANGE UP
LEUKOCYTE ESTERASE UR-ACNC: NEGATIVE — SIGNIFICANT CHANGE UP
LYMPHOCYTES # BLD AUTO: 3.37 K/UL — SIGNIFICANT CHANGE UP (ref 1.2–3.4)
LYMPHOCYTES # BLD AUTO: 50.7 % — SIGNIFICANT CHANGE UP (ref 20.5–51.1)
MCHC RBC-ENTMCNC: 32.4 PG — HIGH (ref 27–31)
MCHC RBC-ENTMCNC: 34.7 G/DL — SIGNIFICANT CHANGE UP (ref 32–37)
MCV RBC AUTO: 93.3 FL — SIGNIFICANT CHANGE UP (ref 81–99)
MONOCYTES # BLD AUTO: 0.58 K/UL — SIGNIFICANT CHANGE UP (ref 0.1–0.6)
MONOCYTES NFR BLD AUTO: 8.7 % — SIGNIFICANT CHANGE UP (ref 1.7–9.3)
NEUTROPHILS # BLD AUTO: 2.26 K/UL — SIGNIFICANT CHANGE UP (ref 1.4–6.5)
NEUTROPHILS NFR BLD AUTO: 33.9 % — LOW (ref 42.2–75.2)
NITRITE UR-MCNC: NEGATIVE — SIGNIFICANT CHANGE UP
NRBC # BLD: 0 /100 WBCS — SIGNIFICANT CHANGE UP (ref 0–0)
PH UR: 6 — SIGNIFICANT CHANGE UP (ref 5–8)
PLATELET # BLD AUTO: 241 K/UL — SIGNIFICANT CHANGE UP (ref 130–400)
POTASSIUM SERPL-MCNC: 4.7 MMOL/L — SIGNIFICANT CHANGE UP (ref 3.5–5)
POTASSIUM SERPL-SCNC: 4.7 MMOL/L — SIGNIFICANT CHANGE UP (ref 3.5–5)
PROT SERPL-MCNC: 7.3 G/DL — SIGNIFICANT CHANGE UP (ref 6–8)
PROT UR-MCNC: ABNORMAL
PROTHROM AB SERPL-ACNC: 9.6 SEC — LOW (ref 9.95–12.87)
RBC # BLD: 4.51 M/UL — SIGNIFICANT CHANGE UP (ref 4.2–5.4)
RBC # FLD: 12.6 % — SIGNIFICANT CHANGE UP (ref 11.5–14.5)
RBC CASTS # UR COMP ASSIST: 5 /HPF — HIGH (ref 0–4)
SODIUM SERPL-SCNC: 139 MMOL/L — SIGNIFICANT CHANGE UP (ref 135–146)
SP GR SPEC: 1.04 — HIGH (ref 1.01–1.03)
UROBILINOGEN FLD QL: ABNORMAL
WBC # BLD: 6.65 K/UL — SIGNIFICANT CHANGE UP (ref 4.8–10.8)
WBC # FLD AUTO: 6.65 K/UL — SIGNIFICANT CHANGE UP (ref 4.8–10.8)
WBC UR QL: 4 /HPF — SIGNIFICANT CHANGE UP (ref 0–5)

## 2022-10-10 PROCEDURE — 93010 ELECTROCARDIOGRAM REPORT: CPT

## 2022-10-10 RX ORDER — DEXTROAMPHETAMINE SACCHARATE, AMPHETAMINE ASPARTATE, DEXTROAMPHETAMINE SULFATE AND AMPHETAMINE SULFATE 1.875; 1.875; 1.875; 1.875 MG/1; MG/1; MG/1; MG/1
0 TABLET ORAL
Qty: 0 | Refills: 0 | DISCHARGE

## 2022-10-10 NOTE — H&P PST ADULT - PSYCHIATRIC
Patient has been notified, and will not proceed. No further questions nor concerns.    alert and oriented x3/anxious

## 2022-10-10 NOTE — H&P PST ADULT - REASON FOR ADMISSION
Patient is a  57 year old  female presenting to PAST in preparation for OPEN REDUCTION INTERNAL FIXATION RIGHT PROXIMAL HUMERUS FRACTURE POSSIBLE HEMIARTHROPLAST   on 10/17/22  under general  anesthesia by Dr. guerrero

## 2022-10-10 NOTE — H&P PST ADULT - HISTORY OF PRESENT ILLNESS
pt fell roller-skating and   now for planned procedure     PATIENT CURRENTLY DENIES CHEST PAIN  SHORTNESS OF BREATH  PALPITATIONS,  DYSURIA, OR UPPER RESPIRATORY INFECTION IN PAST 2 WEEKS  EXERCISE  TOLERANCE  1-2 FLIGHT OF STAIRS  WITHOUT SHORTNESS OF BREATH  denies travel outside the [place] in the past 30 days  Patient denies any signs or symptoms of COVID 19 and denies contact with known positive individuals.  They have an appointment for repeat COVID testing pre-procedure and acknowledge its time and place.  They were instructed to quarantine pre-procedure, practice exposure control measures, continue to self-monitor and report any concerns to their proceduralist.  pt advised self quarantine till day of procedure  Anesthesia Alert  NO--Difficult Airway  History of neck surgery - laminectomy   No radiation to neck   NO--Limited ROM of neck  NO--History of Malignant hyperthermia  NO--No personal or family history of Pseudocholinesterase deficiency.  NO--Prior Anesthesia Complication  + Latex Allergy  NO--Loose teeth  NO--History of Rheumatoid Arthritis  NO--Bleeding risk  NO--SARAH  NO--Other_____    PT DENIES ANY RASHES, ABRASION, OR OPEN WOUNDS OR CUTS    AS PER THE PT, THIS IS HIS/HER COMPLETE MEDICAL AND SURGICAL HX, INCLUDING MEDICATIONS PRESCRIBED AND OVER THE COUNTER    Patient verbalized understanding of instructions and was given the opportunity to ask questions and have them answered.    pt denies any suicidal ideation or thoughts, pt states not a threat to self or others    [id number] [id number]    Other displaced fracture of upper end of right humerus, subsequent encounter for fracture with routine healing    Encounter for other preprocedural examination    ^S42.334Q 01118    No pertinent family history in first degree relatives    Other displaced fracture of upper end of right humerus, subsequent encounter for fracture with routine healing    Encounter for other preprocedural examination    No pertinent past medical history    Bipolar 1 disorder    ADHD    H/O laminectomy

## 2022-10-13 ENCOUNTER — OUTPATIENT (OUTPATIENT)
Dept: OUTPATIENT SERVICES | Facility: HOSPITAL | Age: 57
LOS: 1 days | Discharge: HOME | End: 2022-10-13

## 2022-10-13 ENCOUNTER — RESULT REVIEW (OUTPATIENT)
Age: 57
End: 2022-10-13

## 2022-10-13 DIAGNOSIS — Z98.890 OTHER SPECIFIED POSTPROCEDURAL STATES: Chronic | ICD-10-CM

## 2022-10-13 DIAGNOSIS — S42.291A OTHER DISPLACED FRACTURE OF UPPER END OF RIGHT HUMERUS, INITIAL ENCOUNTER FOR CLOSED FRACTURE: ICD-10-CM

## 2022-10-13 DIAGNOSIS — M25.511 PAIN IN RIGHT SHOULDER: ICD-10-CM

## 2022-10-13 PROCEDURE — 73200 CT UPPER EXTREMITY W/O DYE: CPT | Mod: 26,RT

## 2022-10-14 ENCOUNTER — LABORATORY RESULT (OUTPATIENT)
Age: 57
End: 2022-10-14

## 2022-10-18 ENCOUNTER — RX RENEWAL (OUTPATIENT)
Age: 57
End: 2022-10-18

## 2022-10-21 ENCOUNTER — LABORATORY RESULT (OUTPATIENT)
Age: 57
End: 2022-10-21

## 2022-10-21 NOTE — ASU PATIENT PROFILE, ADULT - FALL HARM RISK - UNIVERSAL INTERVENTIONS
Bed in lowest position, wheels locked, appropriate side rails in place/Call bell, personal items and telephone in reach/Instruct patient to call for assistance before getting out of bed or chair/Non-slip footwear when patient is out of bed/[place] to call system/Physically safe environment - no spills, clutter or unnecessary equipment/Purposeful Proactive Rounding/Room/bathroom lighting operational, light cord in reach

## 2022-10-24 ENCOUNTER — APPOINTMENT (OUTPATIENT)
Dept: ORTHOPEDIC SURGERY | Facility: HOSPITAL | Age: 57
End: 2022-10-24

## 2022-10-24 ENCOUNTER — TRANSCRIPTION ENCOUNTER (OUTPATIENT)
Age: 57
End: 2022-10-24

## 2022-10-24 ENCOUNTER — INPATIENT (INPATIENT)
Facility: HOSPITAL | Age: 57
LOS: 0 days | Discharge: HOME | End: 2022-10-25
Attending: ORTHOPAEDIC SURGERY

## 2022-10-24 VITALS
HEART RATE: 87 BPM | WEIGHT: 149.91 LBS | RESPIRATION RATE: 18 BRPM | SYSTOLIC BLOOD PRESSURE: 134 MMHG | DIASTOLIC BLOOD PRESSURE: 78 MMHG | OXYGEN SATURATION: 96 % | TEMPERATURE: 98 F | HEIGHT: 67 IN

## 2022-10-24 DIAGNOSIS — Z98.890 OTHER SPECIFIED POSTPROCEDURAL STATES: Chronic | ICD-10-CM

## 2022-10-24 LAB
BLD GP AB SCN SERPL QL: SIGNIFICANT CHANGE UP
HCT VFR BLD CALC: 33 % — LOW (ref 37–47)
HGB BLD-MCNC: 11.2 G/DL — LOW (ref 12–16)
MCHC RBC-ENTMCNC: 32.5 PG — HIGH (ref 27–31)
MCHC RBC-ENTMCNC: 33.9 G/DL — SIGNIFICANT CHANGE UP (ref 32–37)
MCV RBC AUTO: 95.7 FL — SIGNIFICANT CHANGE UP (ref 81–99)
NRBC # BLD: 0 /100 WBCS — SIGNIFICANT CHANGE UP (ref 0–0)
PLATELET # BLD AUTO: 328 K/UL — SIGNIFICANT CHANGE UP (ref 130–400)
RBC # BLD: 3.45 M/UL — LOW (ref 4.2–5.4)
RBC # FLD: 12.8 % — SIGNIFICANT CHANGE UP (ref 11.5–14.5)
WBC # BLD: 12 K/UL — HIGH (ref 4.8–10.8)
WBC # FLD AUTO: 12 K/UL — HIGH (ref 4.8–10.8)

## 2022-10-24 PROCEDURE — 23615 OPTX PROX HUMRL FX W/INT FIX: CPT | Mod: 58,RT

## 2022-10-24 PROCEDURE — 23430 REPAIR BICEPS TENDON: CPT | Mod: 58,RT

## 2022-10-24 RX ORDER — BUPROPION HYDROCHLORIDE 150 MG/1
300 TABLET, EXTENDED RELEASE ORAL DAILY
Refills: 0 | Status: DISCONTINUED | OUTPATIENT
Start: 2022-10-24 | End: 2022-10-25

## 2022-10-24 RX ORDER — HYDROMORPHONE HYDROCHLORIDE 2 MG/ML
0.5 INJECTION INTRAMUSCULAR; INTRAVENOUS; SUBCUTANEOUS
Refills: 0 | Status: DISCONTINUED | OUTPATIENT
Start: 2022-10-24 | End: 2022-10-24

## 2022-10-24 RX ORDER — OXYCODONE HYDROCHLORIDE 5 MG/1
1 TABLET ORAL
Qty: 20 | Refills: 0
Start: 2022-10-24

## 2022-10-24 RX ORDER — FAMOTIDINE 10 MG/ML
1 INJECTION INTRAVENOUS
Qty: 60 | Refills: 2
Start: 2022-10-24

## 2022-10-24 RX ORDER — INFLUENZA VIRUS VACCINE 15; 15; 15; 15 UG/.5ML; UG/.5ML; UG/.5ML; UG/.5ML
0.5 SUSPENSION INTRAMUSCULAR ONCE
Refills: 0 | Status: DISCONTINUED | OUTPATIENT
Start: 2022-10-24 | End: 2022-10-25

## 2022-10-24 RX ORDER — ONDANSETRON 8 MG/1
4 TABLET, FILM COATED ORAL ONCE
Refills: 0 | Status: COMPLETED | OUTPATIENT
Start: 2022-10-24 | End: 2022-10-24

## 2022-10-24 RX ORDER — HYDROMORPHONE HYDROCHLORIDE 2 MG/ML
1 INJECTION INTRAMUSCULAR; INTRAVENOUS; SUBCUTANEOUS
Refills: 0 | Status: DISCONTINUED | OUTPATIENT
Start: 2022-10-24 | End: 2022-10-24

## 2022-10-24 RX ORDER — MEPERIDINE HYDROCHLORIDE 50 MG/ML
12.5 INJECTION INTRAMUSCULAR; INTRAVENOUS; SUBCUTANEOUS
Refills: 0 | Status: DISCONTINUED | OUTPATIENT
Start: 2022-10-24 | End: 2022-10-24

## 2022-10-24 RX ORDER — CEFAZOLIN SODIUM 1 G
2000 VIAL (EA) INJECTION ONCE
Refills: 0 | Status: COMPLETED | OUTPATIENT
Start: 2022-10-24 | End: 2022-10-24

## 2022-10-24 RX ORDER — ARIPIPRAZOLE 15 MG/1
30 TABLET ORAL DAILY
Refills: 0 | Status: DISCONTINUED | OUTPATIENT
Start: 2022-10-24 | End: 2022-10-25

## 2022-10-24 RX ORDER — OXYCODONE HYDROCHLORIDE 5 MG/1
5 TABLET ORAL EVERY 4 HOURS
Refills: 0 | Status: DISCONTINUED | OUTPATIENT
Start: 2022-10-24 | End: 2022-10-25

## 2022-10-24 RX ORDER — CEFAZOLIN SODIUM 1 G
2000 VIAL (EA) INJECTION EVERY 8 HOURS
Refills: 0 | Status: COMPLETED | OUTPATIENT
Start: 2022-10-24 | End: 2022-10-24

## 2022-10-24 RX ORDER — CEPHALEXIN 500 MG
1 CAPSULE ORAL
Qty: 8 | Refills: 0
Start: 2022-10-24 | End: 2022-10-25

## 2022-10-24 RX ORDER — CEFAZOLIN SODIUM 1 G
VIAL (EA) INJECTION
Refills: 0 | Status: COMPLETED | OUTPATIENT
Start: 2022-10-24 | End: 2022-10-24

## 2022-10-24 RX ORDER — ONDANSETRON 8 MG/1
4 TABLET, FILM COATED ORAL EVERY 6 HOURS
Refills: 0 | Status: DISCONTINUED | OUTPATIENT
Start: 2022-10-24 | End: 2022-10-25

## 2022-10-24 RX ORDER — SODIUM CHLORIDE 9 MG/ML
1000 INJECTION INTRAMUSCULAR; INTRAVENOUS; SUBCUTANEOUS
Refills: 0 | Status: DISCONTINUED | OUTPATIENT
Start: 2022-10-24 | End: 2022-10-25

## 2022-10-24 RX ORDER — LANOLIN ALCOHOL/MO/W.PET/CERES
3 CREAM (GRAM) TOPICAL AT BEDTIME
Refills: 0 | Status: DISCONTINUED | OUTPATIENT
Start: 2022-10-24 | End: 2022-10-25

## 2022-10-24 RX ORDER — ENOXAPARIN SODIUM 100 MG/ML
40 INJECTION SUBCUTANEOUS EVERY 24 HOURS
Refills: 0 | Status: DISCONTINUED | OUTPATIENT
Start: 2022-10-25 | End: 2022-10-25

## 2022-10-24 RX ORDER — SODIUM CHLORIDE 9 MG/ML
1000 INJECTION, SOLUTION INTRAVENOUS
Refills: 0 | Status: DISCONTINUED | OUTPATIENT
Start: 2022-10-24 | End: 2022-10-24

## 2022-10-24 RX ORDER — FAMOTIDINE 10 MG/ML
20 INJECTION INTRAVENOUS EVERY 12 HOURS
Refills: 0 | Status: DISCONTINUED | OUTPATIENT
Start: 2022-10-24 | End: 2022-10-25

## 2022-10-24 RX ORDER — IBUPROFEN 200 MG
1 TABLET ORAL
Qty: 60 | Refills: 2
Start: 2022-10-24

## 2022-10-24 RX ORDER — ACETAMINOPHEN 500 MG
650 TABLET ORAL EVERY 6 HOURS
Refills: 0 | Status: DISCONTINUED | OUTPATIENT
Start: 2022-10-24 | End: 2022-10-25

## 2022-10-24 RX ADMIN — SODIUM CHLORIDE 100 MILLILITER(S): 9 INJECTION INTRAMUSCULAR; INTRAVENOUS; SUBCUTANEOUS at 17:00

## 2022-10-24 RX ADMIN — ENOXAPARIN SODIUM 40 MILLIGRAM(S): 100 INJECTION SUBCUTANEOUS at 23:04

## 2022-10-24 RX ADMIN — OXYCODONE HYDROCHLORIDE 5 MILLIGRAM(S): 5 TABLET ORAL at 22:43

## 2022-10-24 RX ADMIN — ONDANSETRON 4 MILLIGRAM(S): 8 TABLET, FILM COATED ORAL at 14:30

## 2022-10-24 RX ADMIN — FAMOTIDINE 20 MILLIGRAM(S): 10 INJECTION INTRAVENOUS at 19:07

## 2022-10-24 RX ADMIN — Medication 100 MILLIGRAM(S): at 22:44

## 2022-10-24 RX ADMIN — SODIUM CHLORIDE 120 MILLILITER(S): 9 INJECTION, SOLUTION INTRAVENOUS at 13:46

## 2022-10-24 RX ADMIN — Medication 650 MILLIGRAM(S): at 23:04

## 2022-10-24 RX ADMIN — SODIUM CHLORIDE 100 MILLILITER(S): 9 INJECTION INTRAMUSCULAR; INTRAVENOUS; SUBCUTANEOUS at 19:07

## 2022-10-24 RX ADMIN — Medication 100 MILLIGRAM(S): at 16:30

## 2022-10-24 RX ADMIN — OXYCODONE HYDROCHLORIDE 5 MILLIGRAM(S): 5 TABLET ORAL at 23:13

## 2022-10-24 NOTE — BRIEF OPERATIVE NOTE - NSICDXBRIEFPROCEDURE_GEN_ALL_CORE_FT
PROCEDURES:  ORIF, fracture, proximal humerus 24-Oct-2022 13:35:26 CPT code 52016 Lester Hughes  Biceps tenodesis 24-Oct-2022 13:35:58 CPT code 62256 Lester Hughes

## 2022-10-24 NOTE — PROGRESS NOTE ADULT - ASSESSMENT
s/p right proximal humerus fracture     pain control   dvt proph   am labs   oob to chair   nwb right upper ext   abx 24 hours   incentive   dispo in am

## 2022-10-24 NOTE — PATIENT PROFILE ADULT - FALL HARM RISK - HARM RISK INTERVENTIONS

## 2022-10-24 NOTE — BRIEF OPERATIVE NOTE - NSICDXBRIEFPOSTOP_GEN_ALL_CORE_FT
POST-OP DIAGNOSIS:  Closed 4-part fracture of proximal end of right humerus 24-Oct-2022 13:36:46  Lester Hughes

## 2022-10-24 NOTE — BRIEF OPERATIVE NOTE - NSICDXBRIEFPREOP_GEN_ALL_CORE_FT
PRE-OP DIAGNOSIS:  Closed 4-part fracture of proximal end of right humerus 24-Oct-2022 13:36:40  Lester Hughes

## 2022-10-24 NOTE — PATIENT PROFILE ADULT - CONTRAINDICATIONS & PRECAUTIONS (SELECT ALL THAT APPLY)
-COVID PCR collected, will follow up with results; self-isolate/quarantine until results are provided  -Self-Quarantine x 10 total days, starting day 1 of symptom onset if positive.;Recommend 7day quarantine even if results are negative, due to exposure and symptoms  -Stay hydrated  -Eat whole, unprocessed, nutrient-dense food  -Can continue medications, vitamins, and supplements at home  -Follow CDC guidelines for social distancing, adequate hand hygiene, and wearing face mask with non-household contacts and in public  -Symptom management: Tylenol/ibuprofen for pain/fever, Mucinex for productive cough, cepacol throat lozenges, salt water gargles for sore throat, etc  -If applicable:Follow up job's HR regarding policy to return to work    -If symptoms are severe and cannot be managed at home, please go to ED for further evaluation and management  
none...

## 2022-10-24 NOTE — CHART NOTE - NSCHARTNOTEFT_GEN_A_CORE
PACU ANESTHESIA ADMISSION NOTE      Procedure: ORIF, fracture, proximal humerus  CPT code 37635    Biceps tenodesis  CPT code 51618      Post op diagnosis:  Closed 4-part fracture of proximal end of right humerus        ____  Intubated  TV:______       Rate: ______      FiO2: ______    _x___  Patent Airway    __x__  Full return of protective reflexes    __x__  Full recovery from anesthesia / back to baseline status    Vitals:  temp(F) 98  /78  spo2 98  RR 17  pulse 107    Mental Status:  ___x _ Awake   _____ Alert   _____ Drowsy   _____ Sedated    Nausea/Vomiting:  ___x _ NO  ______Yes,   See Post - Op Orders          Pain Scale (0-10):  _____    Treatment: ____ None    x ____ See Post - Op/PCA Orders    Post - Operative Fluids:   ____ Oral   __x __ See Post - Op Orders    Plan: Discharge:   _x ___Home       _____Floor     _____Critical Care    _____  Other:_________________    Comments: uneventful anesthesia course no complications. Vitals stable. Pt transferred to PACU

## 2022-10-24 NOTE — BRIEF OPERATIVE NOTE - OPERATION/FINDINGS
above fracture, comminuted greater trochanteric facture, anterolateral humerus split with lesser; post op pendulums x 1-2 weeks then will start active assisted forward flexion; 1 lbs lifting x 6 weeks, Abx per protocol  Dict:  Implants: Synthes locking proximal humerus plate with 1 x 3.5mm cortical, 10 x 3.5mm locking; 2 x 4.0 cancellous outside of plate, 5 x #2 FiberWires above fracture, comminuted greater trochanteric facture, anterolateral humerus split with lesser; post op pendulums x 1-2 weeks then will start active assisted forward flexion; 1 lbs lifting x 6 weeks, Abx per protocol  Dict:[id number]  Implants: Synthes locking proximal humerus plate with 1 x 3.5mm cortical, 9 x 3.5mm locking; 2 x 4.0 cancellous outside of plate, 5 x #2 FiberWires

## 2022-10-24 NOTE — PATIENT PROFILE ADULT - FUNCTIONAL ASSESSMENT - BASIC MOBILITY 2.
Fwd to Dr Bridgette STATON. Below refill request requires your intervention because:     · No protocol for medication available for norco    · Last refilled on 8/18/21 for #120 with 0 additional refills.       4 = No assist / stand by assistance

## 2022-10-24 NOTE — PROGRESS NOTE ADULT - SUBJECTIVE AND OBJECTIVE BOX
post op note     s/p right proximal humerus orif pod 0   pt seen in pacu no complaints pain controlled     Vital Signs Last 24 Hrs  T(C): 36.8 (24 Oct 2022 15:45), Max: 37.1 (24 Oct 2022 13:45)  T(F): 98.3 (24 Oct 2022 15:45), Max: 98.8 (24 Oct 2022 13:45)  HR: 85 (24 Oct 2022 15:45) (85 - 107)  BP: 129/69 (24 Oct 2022 15:45) (121/68 - 145/68)  BP(mean): --  RR: 15 (24 Oct 2022 15:45) (15 - 34)  SpO2: 98% (24 Oct 2022 15:45) (93% - 98%)                          11.2   12.00 )-----------( 328      ( 24 Oct 2022 16:22 )             33.0       right upper ext:   aquacell in place minor spotting on dressing in sling   fingers are warm well perfused pulse intact   nvid

## 2022-10-25 ENCOUNTER — TRANSCRIPTION ENCOUNTER (OUTPATIENT)
Age: 57
End: 2022-10-25

## 2022-10-25 VITALS
OXYGEN SATURATION: 96 % | RESPIRATION RATE: 18 BRPM | DIASTOLIC BLOOD PRESSURE: 65 MMHG | TEMPERATURE: 97 F | SYSTOLIC BLOOD PRESSURE: 136 MMHG | HEART RATE: 99 BPM

## 2022-10-25 LAB
HCT VFR BLD CALC: 28.2 % — LOW (ref 37–47)
HGB BLD-MCNC: 9.8 G/DL — LOW (ref 12–16)
MCHC RBC-ENTMCNC: 33.4 PG — HIGH (ref 27–31)
MCHC RBC-ENTMCNC: 34.8 G/DL — SIGNIFICANT CHANGE UP (ref 32–37)
MCV RBC AUTO: 96.2 FL — SIGNIFICANT CHANGE UP (ref 81–99)
NRBC # BLD: 0 /100 WBCS — SIGNIFICANT CHANGE UP (ref 0–0)
PLATELET # BLD AUTO: 299 K/UL — SIGNIFICANT CHANGE UP (ref 130–400)
RBC # BLD: 2.93 M/UL — LOW (ref 4.2–5.4)
RBC # FLD: 12.8 % — SIGNIFICANT CHANGE UP (ref 11.5–14.5)
WBC # BLD: 11.85 K/UL — HIGH (ref 4.8–10.8)
WBC # FLD AUTO: 11.85 K/UL — HIGH (ref 4.8–10.8)

## 2022-10-25 RX ORDER — OXYCODONE HYDROCHLORIDE 5 MG/1
1 TABLET ORAL
Qty: 28 | Refills: 0
Start: 2022-10-25 | End: 2022-10-31

## 2022-10-25 RX ORDER — ACETAMINOPHEN 500 MG
2 TABLET ORAL
Qty: 0 | Refills: 0 | DISCHARGE
Start: 2022-10-25

## 2022-10-25 RX ORDER — IBUPROFEN 200 MG
1 TABLET ORAL
Qty: 0 | Refills: 0 | DISCHARGE

## 2022-10-25 RX ORDER — ARIPIPRAZOLE 15 MG/1
1 TABLET ORAL
Qty: 0 | Refills: 0 | DISCHARGE

## 2022-10-25 RX ORDER — FAMOTIDINE 10 MG/ML
1 INJECTION INTRAVENOUS
Qty: 60 | Refills: 2
Start: 2022-10-25

## 2022-10-25 RX ADMIN — OXYCODONE HYDROCHLORIDE 5 MILLIGRAM(S): 5 TABLET ORAL at 02:36

## 2022-10-25 RX ADMIN — Medication 650 MILLIGRAM(S): at 11:09

## 2022-10-25 RX ADMIN — BUPROPION HYDROCHLORIDE 300 MILLIGRAM(S): 150 TABLET, EXTENDED RELEASE ORAL at 11:10

## 2022-10-25 RX ADMIN — ARIPIPRAZOLE 30 MILLIGRAM(S): 15 TABLET ORAL at 11:10

## 2022-10-25 RX ADMIN — Medication 650 MILLIGRAM(S): at 05:43

## 2022-10-25 RX ADMIN — FAMOTIDINE 20 MILLIGRAM(S): 10 INJECTION INTRAVENOUS at 05:43

## 2022-10-25 NOTE — DISCHARGE NOTE NURSING/CASE MANAGEMENT/SOCIAL WORK - PATIENT PORTAL LINK FT
You can access the FollowMyHealth Patient Portal offered by  by registering at the following website: [email or web address]. By joining [employer]’s FollowMyHealth portal, you will also be able to view your health information using other applications (apps) compatible with our system.

## 2022-10-25 NOTE — DISCHARGE NOTE PROVIDER - CARE PROVIDER_API CALL
Lester Hughes)  Orthopaedic Surgery  [address] [place] [address]  Phone: [phone]  Fax: [phone]  Follow Up Time:

## 2022-10-25 NOTE — DISCHARGE NOTE PROVIDER - NSDCFUSCHEDAPPT_GEN_ALL_CORE_FT
Lester Hughes Physician [facility]  [address]  Scheduled Appointment: 11/08/2022

## 2022-10-25 NOTE — DISCHARGE NOTE PROVIDER - NSDCCPCAREPLAN_GEN_ALL_CORE_FT
PRINCIPAL DISCHARGE DIAGNOSIS  Diagnosis: Fracture of proximal end of right humerus  Assessment and Plan of Treatment:

## 2022-10-25 NOTE — DISCHARGE NOTE PROVIDER - NSDCFUADDINST_GEN_ALL_CORE_FT
post op pendulums x 1-2 weeks then will start active assisted forward flexion; 1 lbs lifting x 6 weeks

## 2022-10-25 NOTE — DISCHARGE NOTE PROVIDER - NSDCMRMEDTOKEN_GEN_ALL_CORE_FT
acetaminophen 325 mg oral tablet: 2 tab(s) orally every 6 hours  famotidine 20 mg oral tablet: 1 tab(s) orally every 8 hours, As Needed MDD:3   Vyvanse 50 mg oral capsule: 1 cap(s) orally once a day (in the morning)  Wellbutrin  mg/24 hours oral tablet, extended release: 1 tab(s) orally every 24 hours   acetaminophen 325 mg oral tablet: 2 tab(s) orally every 6 hours  famotidine 20 mg oral tablet: 1 tab(s) orally every 8 hours, As Needed MDD:3   oxyCODONE 5 mg oral capsule: 1 cap(s) orally every 6 hours, As Needed for pain MDD:4 capsules  Vyvanse 50 mg oral capsule: 1 cap(s) orally once a day (in the morning)  Wellbutrin  mg/24 hours oral tablet, extended release: 1 tab(s) orally every 24 hours

## 2022-10-25 NOTE — DISCHARGE NOTE NURSING/CASE MANAGEMENT/SOCIAL WORK - NSDCPEFALRISK_GEN_ALL_CORE
For information on Fall & Injury Prevention, visit: [email or web address] OR  [email or web address] OR  https://www.cdc.gov/steadi/patient.html

## 2022-11-08 ENCOUNTER — APPOINTMENT (OUTPATIENT)
Dept: ORTHOPEDIC SURGERY | Facility: CLINIC | Age: 57
End: 2022-11-08

## 2022-11-08 PROCEDURE — 99024 POSTOP FOLLOW-UP VISIT: CPT

## 2022-11-08 PROCEDURE — 73030 X-RAY EXAM OF SHOULDER: CPT | Mod: RT

## 2022-11-08 NOTE — ASSESSMENT
[FreeTextEntry1] :   57-year-old woman status post open reduction internal fixation of a 4 part right proximal humerus fracture.  She is now 2 weeks from surgery.  Like to continue with pendulum exercises and elbow range of motion exercises.  In 1 week she can begin active assisted forward flexion exercise in the plane of the scapula.  I have reviewed these exercises with her.  She can continue with Tylenol and Motrin for pain relief.  Have her follow up in my office in 3-4 weeks time for repeat evaluation radiographs of her right shoulder which include AP internal rotation lateral external rotation AP and Y scapular views.  All questions were answered to her satisfaction.  She agrees with the plan.  Any problems or happy to see her back sooner.

## 2022-11-08 NOTE — IMAGING
[de-identified] :   Pleasant middle-aged woman stands reasonably comfortably in my office in no distress.\par \par Physical examination:\par Right shoulder:  Surgical incision is clean dry and intact no erythema induration or fluctuance.  She is able to engage in pendulum exercises and elbow range of motion exercises without difficulty.  Active assisted forward flexion exercise in plane of the scapular to 80° without any undue discomfort.  She has intact light sensation axillary nerve distribution.\par \par Radiographs:\par Right shoulder (AP, internal rotation lateral, Y scapula):  Comminuted 4 part right proximal humerus fracture acceptably aligned.  Humeral head is now aligned more normally than it was prior to surgery.  Hardware remains in place.

## 2022-11-08 NOTE — HISTORY OF PRESENT ILLNESS
[de-identified] :  57-year-old woman returns for 1st postoperative visit status post open reduction internal fixation of 4 part right proximal humerus fracture on October 24, 2022. Patient denies any fevers or drainage.  Notes her pain is slowly subsiding.  Is mild discomfort over forearm.  Denies any sensory complaints.  She is taking her arm out of her sling and begun to work on elbow range of motion exercises and pendulum exercises.  She she is not actively trying to range her shoulder.  She denies any fevers or drainage.

## 2022-12-08 ENCOUNTER — APPOINTMENT (OUTPATIENT)
Dept: ORTHOPEDIC SURGERY | Facility: CLINIC | Age: 57
End: 2022-12-08

## 2022-12-28 ENCOUNTER — RX RENEWAL (OUTPATIENT)
Age: 57
End: 2022-12-28

## 2023-01-17 ENCOUNTER — APPOINTMENT (OUTPATIENT)
Dept: ORTHOPEDIC SURGERY | Facility: CLINIC | Age: 58
End: 2023-01-17
Payer: MEDICAID

## 2023-01-17 PROCEDURE — 99024 POSTOP FOLLOW-UP VISIT: CPT

## 2023-01-17 PROCEDURE — 73030 X-RAY EXAM OF SHOULDER: CPT | Mod: RT

## 2023-01-17 NOTE — HISTORY OF PRESENT ILLNESS
[de-identified] :  57-year-old woman returns status post delayed open reduction internal fixation of a 4 part right proximal humerus fracture October 24, 2022.  Patient returns today participating a self-directed exercise program.  Patient remains on ibuprofen for pain relief.  Denies any fevers or drainage.  Has been applying with derma to her scar.

## 2023-01-17 NOTE — IMAGING
[de-identified] : Pleasant middle-aged woman sits comfortably my office no distress.\par \par Physical examination:\par Right shoulder:  Surgical incision has healed and largely remodeled.  Mild residual swelling.  Normal light sensation axillary nerve distribution.  Active forward flexion 60°.  Active assisted forward flexion 90°.  Minimal rotational range of motion.  No axillary lymph nodes.\par \par Radiographs:\par Right shoulder (AP, internal rotation lateral, Y-scapula):  Abundant callus formation is noted around the edges of the fracture.  The anterior superior quadrant of the humeral head appears to resort and us several screws appear to be prominent.  Humeral head sits within glenoid articulation appropriately.

## 2023-01-17 NOTE — ASSESSMENT
[FreeTextEntry1] :   A 57-year-old woman status post open reduction internal fixation 4 part right proximal humerus fracture.  She is just shy of 3 months since surgery.  I think at this point time her fracture fragments probably stabilized sufficient for us to remove the prominent screws.  I would recommend removal of these screws to minimize deterioration of glenohumeral joint.  On this may also improve her response to therapy.  We talked about the surgery and patient is willing to proceed.  We will make arrangements timely fashion possible.  All questions answered to her satisfaction.

## 2023-01-21 ENCOUNTER — RX RENEWAL (OUTPATIENT)
Age: 58
End: 2023-01-21

## 2023-01-21 RX ORDER — IBUPROFEN 800 MG/1
800 TABLET, FILM COATED ORAL
Qty: 60 | Refills: 0 | Status: ACTIVE | COMMUNITY
Start: 2022-09-02 | End: 1900-01-01

## 2023-01-24 ENCOUNTER — OUTPATIENT (OUTPATIENT)
Dept: OUTPATIENT SERVICES | Facility: HOSPITAL | Age: 58
LOS: 1 days | Discharge: HOME | End: 2023-01-24

## 2023-01-24 VITALS
OXYGEN SATURATION: 98 % | DIASTOLIC BLOOD PRESSURE: 76 MMHG | WEIGHT: 169.98 LBS | HEART RATE: 76 BPM | RESPIRATION RATE: 16 BRPM | TEMPERATURE: 97 F | SYSTOLIC BLOOD PRESSURE: 144 MMHG | HEIGHT: 67 IN

## 2023-01-24 DIAGNOSIS — Z90.49 ACQUIRED ABSENCE OF OTHER SPECIFIED PARTS OF DIGESTIVE TRACT: Chronic | ICD-10-CM

## 2023-01-24 DIAGNOSIS — T84.84XD PAIN DUE TO INTERNAL ORTHOPEDIC PROSTHETIC DEVICES, IMPLANTS AND GRAFTS, SUBSEQUENT ENCOUNTER: ICD-10-CM

## 2023-01-24 DIAGNOSIS — Z01.818 ENCOUNTER FOR OTHER PREPROCEDURAL EXAMINATION: ICD-10-CM

## 2023-01-24 DIAGNOSIS — Z98.890 OTHER SPECIFIED POSTPROCEDURAL STATES: Chronic | ICD-10-CM

## 2023-01-24 LAB
ALBUMIN SERPL ELPH-MCNC: 4.5 G/DL — SIGNIFICANT CHANGE UP (ref 3.5–5.2)
ALP SERPL-CCNC: 102 U/L — SIGNIFICANT CHANGE UP (ref 30–115)
ALT FLD-CCNC: 16 U/L — SIGNIFICANT CHANGE UP (ref 0–41)
ANION GAP SERPL CALC-SCNC: 11 MMOL/L — SIGNIFICANT CHANGE UP (ref 7–14)
AST SERPL-CCNC: 24 U/L — SIGNIFICANT CHANGE UP (ref 0–41)
BASOPHILS # BLD AUTO: 0.08 K/UL — SIGNIFICANT CHANGE UP (ref 0–0.2)
BASOPHILS NFR BLD AUTO: 0.8 % — SIGNIFICANT CHANGE UP (ref 0–1)
BILIRUB SERPL-MCNC: 0.3 MG/DL — SIGNIFICANT CHANGE UP (ref 0.2–1.2)
BUN SERPL-MCNC: 11 MG/DL — SIGNIFICANT CHANGE UP (ref 10–20)
CALCIUM SERPL-MCNC: 10 MG/DL — SIGNIFICANT CHANGE UP (ref 8.4–10.5)
CHLORIDE SERPL-SCNC: 103 MMOL/L — SIGNIFICANT CHANGE UP (ref 98–110)
CO2 SERPL-SCNC: 25 MMOL/L — SIGNIFICANT CHANGE UP (ref 17–32)
CREAT SERPL-MCNC: 0.8 MG/DL — SIGNIFICANT CHANGE UP (ref 0.7–1.5)
EGFR: 87 ML/MIN/1.73M2 — SIGNIFICANT CHANGE UP
EOSINOPHIL # BLD AUTO: 0.2 K/UL — SIGNIFICANT CHANGE UP (ref 0–0.7)
EOSINOPHIL NFR BLD AUTO: 2 % — SIGNIFICANT CHANGE UP (ref 0–8)
GLUCOSE SERPL-MCNC: 80 MG/DL — SIGNIFICANT CHANGE UP (ref 70–99)
HCT VFR BLD CALC: 41.5 % — SIGNIFICANT CHANGE UP (ref 37–47)
HGB BLD-MCNC: 13.9 G/DL — SIGNIFICANT CHANGE UP (ref 12–16)
IMM GRANULOCYTES NFR BLD AUTO: 0.4 % — HIGH (ref 0.1–0.3)
LYMPHOCYTES # BLD AUTO: 2.84 K/UL — SIGNIFICANT CHANGE UP (ref 1.2–3.4)
LYMPHOCYTES # BLD AUTO: 28.4 % — SIGNIFICANT CHANGE UP (ref 20.5–51.1)
MCHC RBC-ENTMCNC: 31.4 PG — HIGH (ref 27–31)
MCHC RBC-ENTMCNC: 33.5 G/DL — SIGNIFICANT CHANGE UP (ref 32–37)
MCV RBC AUTO: 93.9 FL — SIGNIFICANT CHANGE UP (ref 81–99)
MONOCYTES # BLD AUTO: 0.5 K/UL — SIGNIFICANT CHANGE UP (ref 0.1–0.6)
MONOCYTES NFR BLD AUTO: 5 % — SIGNIFICANT CHANGE UP (ref 1.7–9.3)
NEUTROPHILS # BLD AUTO: 6.35 K/UL — SIGNIFICANT CHANGE UP (ref 1.4–6.5)
NEUTROPHILS NFR BLD AUTO: 63.4 % — SIGNIFICANT CHANGE UP (ref 42.2–75.2)
NRBC # BLD: 0 /100 WBCS — SIGNIFICANT CHANGE UP (ref 0–0)
PLATELET # BLD AUTO: 240 K/UL — SIGNIFICANT CHANGE UP (ref 130–400)
POTASSIUM SERPL-MCNC: 4.1 MMOL/L — SIGNIFICANT CHANGE UP (ref 3.5–5)
POTASSIUM SERPL-SCNC: 4.1 MMOL/L — SIGNIFICANT CHANGE UP (ref 3.5–5)
PROT SERPL-MCNC: 6.7 G/DL — SIGNIFICANT CHANGE UP (ref 6–8)
RBC # BLD: 4.42 M/UL — SIGNIFICANT CHANGE UP (ref 4.2–5.4)
RBC # FLD: 13 % — SIGNIFICANT CHANGE UP (ref 11.5–14.5)
SODIUM SERPL-SCNC: 139 MMOL/L — SIGNIFICANT CHANGE UP (ref 135–146)
WBC # BLD: 10.01 K/UL — SIGNIFICANT CHANGE UP (ref 4.8–10.8)
WBC # FLD AUTO: 10.01 K/UL — SIGNIFICANT CHANGE UP (ref 4.8–10.8)

## 2023-01-24 RX ORDER — ARIPIPRAZOLE 15 MG/1
1 TABLET ORAL
Qty: 0 | Refills: 0 | DISCHARGE

## 2023-01-24 RX ORDER — BUPROPION HYDROCHLORIDE 150 MG/1
1 TABLET, EXTENDED RELEASE ORAL
Qty: 0 | Refills: 0 | DISCHARGE

## 2023-01-24 RX ORDER — LISDEXAMFETAMINE DIMESYLATE 70 MG/1
1 CAPSULE ORAL
Qty: 0 | Refills: 0 | DISCHARGE

## 2023-01-24 RX ORDER — IBUPROFEN 200 MG
1 TABLET ORAL
Qty: 0 | Refills: 0 | DISCHARGE

## 2023-01-24 NOTE — H&P PST ADULT - HISTORY OF PRESENT ILLNESS
57yr old states  injury -Fall roller scat ing 8/2022? states "broke my RT shoulder" s/p RT shoulder ORIF 10/2022 -" I cannot move my arm and shoulder , is very painful" presents to pretesting in prep for REMOVAL OF HARDWARE RIGHT SHOULDER. Denies COVID S/S. Recd 3 doses vaccine. Verbalized understanding of COVID prevention measures. Exercise colleen 2 FOS.  Anesthesia Alert  NO--Difficult Airway  yes --History of neck surgery cervical discectomy  NO--Limited ROM of neck  NO--History of Malignant hyperthermia  NO--Personal or family history of Pseudocholinesterase deficiency  NO--Prior Anesthesia Complication  YES--Latex Allergy  NO--Loose teeth  NO--History of Rheumatoid Arthritis  NO--SARAH  No Bleeding risk  NO--Other_____

## 2023-01-24 NOTE — H&P PST ADULT - NSICDXPASTSURGICALHX_GEN_ALL_CORE_FT
PAST SURGICAL HISTORY:  H/O foot surgery     H/O laminectomy     History of appendectomy     S/P ORIF (open reduction internal fixation) fracture

## 2023-01-24 NOTE — H&P PST ADULT - NSANTHOSAYNRD_GEN_A_CORE
This medical record contains text that has been entered with the assistance of computer voice recognition and dictation software.  Therefore, it may contain unintended errors in text, spelling, punctuation, or grammar        Chief Complaint   Patient presents with   • Annual Exam     Requesting annual lab orders   • Medication Refill     Acyclovir        Netta Lopez is a 71 y.o. female here evaluation and management of:     Has been a couple years since we have last seen each other   Saw my partner 1 year ago         Current Outpatient Medications   Medication Sig Dispense Refill   • acyclovir (ZOVIRAX) 400 MG tablet      • acyclovir (ZOVIRAX) 400 MG tablet Take 1 Tablet by mouth 3 times a day for 5 days. 60 Tablet 5     No current facility-administered medications for this visit.     Patient Active Problem List    Diagnosis Date Noted   • Elevated blood sugar 05/26/2022   • Elevated blood pressure reading 05/26/2022   • Abnormal TSH 05/26/2022   • Hepatitis 05/26/2022   • Hive 07/22/2020   • Abnormal CT scan, kidney 07/22/2020   • Annual physical exam 03/10/2020   • Osteopenia of multiple sites 12/09/2019   • Need for hepatitis C screening test 12/09/2019   • Screening for breast cancer 12/09/2019   • Menopausal and postmenopausal disorder 12/09/2019   • H/O cold sores 01/03/2019     Past Surgical History:   Procedure Laterality Date   • TX BREAST AUGMENTATION WITH IMPLANT  1989      Social History     Tobacco Use   • Smoking status: Never Smoker   • Smokeless tobacco: Never Used   Vaping Use   • Vaping Use: Never used   Substance Use Topics   • Alcohol use: Yes     Comment: social, rare   • Drug use: Never     Family History   Problem Relation Age of Onset   • Lung Cancer Mother    • Lung Cancer Father            ROS    all review of system completed and negative except for those listed above     Objective:     BP (!) 142/90 (BP Location: Left arm, Patient Position: Sitting, BP Cuff Size: Adult)   Pulse 75    "Temp 36.3 °C (97.4 °F) (Temporal)   Ht 1.753 m (5' 9\")   Wt 56.7 kg (125 lb)   SpO2 97%  Body mass index is 18.46 kg/m².  Physical Exam:    Constitutional: Alert, no distress.  Skin: Warm, dry, good turgor, no rashes in visible areas.  Eye: Equal, round and reactive, conjunctiva clear, lids normal.  ENMT: Lips without lesions, good dentition, oropharynx clear.  Neck: Trachea midline, no masses, no thyromegaly. No cervical or supraclavicular lymphadenopathy.  Respiratory: Unlabored respiratory effort, lungs clear to auscultation, no wheezes, no ronchi.  Cardiovascular: Normal S1, S2, no murmur, no edema.  Abdomen: Soft, non-tender, no masses, no hepatosplenomegaly.  Psych: Alert and oriented x3, normal affect and mood.        Assessment and Plan:   The following treatment plan was discussed        Problem List Items Addressed This Visit     H/O cold sores     Refill acylovir              Relevant Medications    acyclovir (ZOVIRAX) 400 MG tablet    Need for hepatitis C screening test    Relevant Orders    HEP C VIRUS ANTIBODY    Annual physical exam     Age appropriate prev health care discussed   Cancer screening discussed   Vaccines discussed   Labs offered   Blood pressure at goal     Anticipatory guidance including SPF and other skin protective measures, dental hygiene and care, regular exercise, diet, stress and family planning advice discussed.               Elevated blood sugar     Will include a1c           Relevant Orders    HEMOGLOBIN A1C    Elevated blood pressure reading     Not isolated  Instructions for home blood pressure monitoring discussed  Follow up 1-2 mo     Likely will need anti hypertensive agent     Labs ordered including urine            Relevant Orders    MICROALBUMIN CREAT RATIO URINE    Abnormal TSH     Will recheck             Relevant Orders    TSH WITH REFLEX TO FT4    Hepatitis     persistent elevated liver enzymes   Will do work up see orders     We discuss etoh she does not drink " regularly , she does not use tylenol regularly she is not overweight or obese     Follow up 1-2 mo                Relevant Orders    Comp Metabolic Panel    HEPATITIS PANEL ACUTE(4 COMPONENTS)    Referral to Genetic Research Studies    -[facility]      Other Visit Diagnoses     Screening for diabetes mellitus (DM)        Relevant Orders    Lipid Profile    Screening for ischemic heart disease        Relevant Orders    Lipid Profile                Instructed to follow up if symptoms worsen or fail to improve, ER/UC precautions discussed as well    Rosemary Diaz MD  [facility], Family Medicine   [address] [place] [address]   [place] [address]  Phone: [phone]                No. SARAH screening performed.  STOP BANG Legend: 0-2 = LOW Risk; 3-4 = INTERMEDIATE Risk; 5-8 = HIGH Risk

## 2023-01-27 ENCOUNTER — LABORATORY RESULT (OUTPATIENT)
Age: 58
End: 2023-01-27

## 2023-01-28 NOTE — ASU PATIENT PROFILE, ADULT - FALL HARM RISK - UNIVERSAL INTERVENTIONS
Bed in lowest position, wheels locked, appropriate side rails in place/Call bell, personal items and telephone in reach/Instruct patient to call for assistance before getting out of bed or chair/Non-slip footwear when patient is out of bed/[place] to call system/Physically safe environment - no spills, clutter or unnecessary equipment/Purposeful Proactive Rounding/Room/bathroom lighting operational, light cord in reach

## 2023-01-30 ENCOUNTER — OUTPATIENT (OUTPATIENT)
Dept: OUTPATIENT SERVICES | Facility: HOSPITAL | Age: 58
LOS: 1 days | Discharge: HOME | End: 2023-01-30

## 2023-01-30 ENCOUNTER — TRANSCRIPTION ENCOUNTER (OUTPATIENT)
Age: 58
End: 2023-01-30

## 2023-01-30 ENCOUNTER — APPOINTMENT (OUTPATIENT)
Dept: ORTHOPEDIC SURGERY | Facility: HOSPITAL | Age: 58
End: 2023-01-30
Payer: MEDICAID

## 2023-01-30 VITALS
DIASTOLIC BLOOD PRESSURE: 60 MMHG | RESPIRATION RATE: 16 BRPM | HEART RATE: 83 BPM | OXYGEN SATURATION: 96 % | TEMPERATURE: 97 F | SYSTOLIC BLOOD PRESSURE: 140 MMHG

## 2023-01-30 VITALS — SYSTOLIC BLOOD PRESSURE: 125 MMHG | HEART RATE: 77 BPM | OXYGEN SATURATION: 96 % | DIASTOLIC BLOOD PRESSURE: 63 MMHG

## 2023-01-30 DIAGNOSIS — Z98.890 OTHER SPECIFIED POSTPROCEDURAL STATES: Chronic | ICD-10-CM

## 2023-01-30 DIAGNOSIS — Z90.49 ACQUIRED ABSENCE OF OTHER SPECIFIED PARTS OF DIGESTIVE TRACT: Chronic | ICD-10-CM

## 2023-01-30 PROCEDURE — 20680 REMOVAL OF IMPLANT DEEP: CPT | Mod: RT

## 2023-01-30 RX ORDER — HYDROMORPHONE HYDROCHLORIDE 2 MG/ML
1 INJECTION INTRAMUSCULAR; INTRAVENOUS; SUBCUTANEOUS
Refills: 0 | Status: DISCONTINUED | OUTPATIENT
Start: 2023-01-30 | End: 2023-01-30

## 2023-01-30 RX ORDER — OXYCODONE HYDROCHLORIDE 5 MG/1
1 TABLET ORAL
Qty: 20 | Refills: 0
Start: 2023-01-30

## 2023-01-30 RX ORDER — IBUPROFEN 200 MG
1 TABLET ORAL
Qty: 60 | Refills: 1
Start: 2023-01-30

## 2023-01-30 RX ORDER — HYDROMORPHONE HYDROCHLORIDE 2 MG/ML
0.5 INJECTION INTRAMUSCULAR; INTRAVENOUS; SUBCUTANEOUS
Refills: 0 | Status: DISCONTINUED | OUTPATIENT
Start: 2023-01-30 | End: 2023-01-30

## 2023-01-30 RX ORDER — CEPHALEXIN 500 MG
1 CAPSULE ORAL
Qty: 6 | Refills: 0
Start: 2023-01-30 | End: 2023-01-31

## 2023-01-30 RX ORDER — ONDANSETRON 8 MG/1
4 TABLET, FILM COATED ORAL ONCE
Refills: 0 | Status: DISCONTINUED | OUTPATIENT
Start: 2023-01-30 | End: 2023-01-30

## 2023-01-30 RX ORDER — FAMOTIDINE 10 MG/ML
1 INJECTION INTRAVENOUS
Qty: 60 | Refills: 1
Start: 2023-01-30

## 2023-01-30 NOTE — CHART NOTE - NSCHARTNOTEFT_GEN_A_CORE
PACU ANESTHESIA ADMISSION NOTE      Procedure: Removal of deep implant  CPT code 91612      Post op diagnosis:  Retained orthopedic hardware        ____  Intubated  TV:______       Rate: ______      FiO2: ______    _x___  Patent Airway    ____  Full return of protective reflexes    ____  Full recovery from anesthesia / back to baseline     Vitals:   T: 97.4          R: 14                 BP: 145/74                 Sat: 98                  P: 81      Mental Status:  ____ Awake   _____ Alert   ___x__ Drowsy   _____ Sedated    Nausea/Vomiting:  ____ NO  ______Yes,   See Post - Op Orders          Pain Scale (0-10):  _____    Treatment: ____ None    ____ See Post - Op/PCA Orders    Post - Operative Fluids:   ____ Oral   ____ See Post - Op Orders    Plan: Discharge:   __x__Home       _____Floor     _____Critical Care    _____  Other:_________________    Comments:
Quality 130: Documentation Of Current Medications In The Medical Record: Current Medications Documented
Quality 431: Preventive Care And Screening: Unhealthy Alcohol Use - Screening: Patient not identified as an unhealthy alcohol user when screened for unhealthy alcohol use using a systematic screening method
Detail Level: Detailed
Quality 226: Preventive Care And Screening: Tobacco Use: Screening And Cessation Intervention: Patient screened for tobacco use and is an ex/non-smoker

## 2023-01-30 NOTE — BRIEF OPERATIVE NOTE - NSICDXBRIEFPREOP_GEN_ALL_CORE_FT
PRE-OP DIAGNOSIS:  Retained orthopedic hardware 30-Jan-2023 09:04:55 prominent right shoulder hardware Lester Hughes

## 2023-01-30 NOTE — ASU DISCHARGE PLAN (ADULT/PEDIATRIC) - ASU DC SPECIAL INSTRUCTIONSFT
Instructions:  -Take Aspirin 81mg every day to prevent blood clot formation  -Take Tylenol for pain. Take Oxycodone for pain not controlled by Tylenol  -Weight bearing status: WBAT for RLE, progress ROM slowly   -Keep splint/dressing clean and dry  -Return to the ED for fevers, chills, severe pain, new onset numbness/tingling, or any other concerning signs

## 2023-01-30 NOTE — BRIEF OPERATIVE NOTE - OPERATION/FINDINGS
above prominent hardware removed; post op WBAT, AROM as tolerated, Abx per protocol  Dict:  Explanted: 8 x 3.5mm locking screws, 1 x Fiberwire suture above prominent hardware removed; post op WBAT, AROM as tolerated, Abx per protocol  Dict:[id number]  Explanted: 8 x 3.5mm locking screws, 1 x Fiberwire suture
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
## 2023-02-03 DIAGNOSIS — Z91.040 LATEX ALLERGY STATUS: ICD-10-CM

## 2023-02-03 DIAGNOSIS — T85.848A PAIN DUE TO OTHER INTERNAL PROSTHETIC DEVICES, IMPLANTS AND GRAFTS, INITIAL ENCOUNTER: ICD-10-CM

## 2023-02-04 LAB
CULTURE RESULTS: SIGNIFICANT CHANGE UP
SPECIMEN SOURCE: SIGNIFICANT CHANGE UP

## 2023-02-07 ENCOUNTER — APPOINTMENT (OUTPATIENT)
Dept: ORTHOPEDIC SURGERY | Facility: CLINIC | Age: 58
End: 2023-02-07
Payer: MEDICAID

## 2023-02-07 PROCEDURE — 99024 POSTOP FOLLOW-UP VISIT: CPT

## 2023-02-07 PROCEDURE — 73030 X-RAY EXAM OF SHOULDER: CPT | Mod: RT

## 2023-02-07 NOTE — HISTORY OF PRESENT ILLNESS
[de-identified] :  57-year-old woman returns for 1st postop visit status post removal of prominent screw fixation right proximal humerus on January 30, 2023.  Patient underwent definitive fixation of a 4 part right proximal humerus fracture on October 24, 2022.  Patient was found to have had hardware prominence.  She notes that her shoulder feels much better since the screws removed.  She denies any fevers or drainage.  She takes Tylenol and over-the-counter NSAIDs for pain relief.  No new sensory complaints.  She has pain with some rotation but with assistance can range her arm up to 90°.

## 2023-02-07 NOTE — ASSESSMENT
[FreeTextEntry1] :   57-year-old woman status post open reduction internal fixation right 4 part proximal humerus fracture we with hardware prominence now removed.  I think she is healing uneventfully she can shower but avoid baths soaks or swimming for at least 2 weeks.  About that time she should get started physical therapy which will focus on active and active assisted range of motion of her shoulder with gentle passive stretching of her shoulder.  Modalities could be utilized adjunct therapy.  Physical therapy work to teach her self-directed exercise program stretching and strengthening.  She has no lifting restrictions.  If she has any problems were happy to see back sooner.  All questions were answered to her satisfaction.  We will see her back in the office in 6-8 weeks time for repeat evaluation with radiographs.  All questions were answered to her satisfaction.

## 2023-02-07 NOTE — IMAGING
[de-identified] : Pleasant young woman sits comfortably my office no distress.\par \par Physical examination:\par Right shoulder:  Surgical incision is clean dry and intact with no erythema induration.  She is a normal light sensation axillary nerve distribution.  Mild swelling about her shoulder.  Active assisted forward flexion is to 90°.  No axillary lymph nodes.\par \par Radiographs:\par Right shoulder (AP, internal rotation lateral, Y scapula):  Complex right 4 part proximal humerus fracture.  Humeral head sits within glenoid fossa appropriately.  There is disc slough shin much of the femoral head and erosive changes noted about the glenoid from her injury and prominent hardware.  Prominent hardware has now been removed.

## 2023-03-30 ENCOUNTER — APPOINTMENT (OUTPATIENT)
Dept: ORTHOPEDIC SURGERY | Facility: CLINIC | Age: 58
End: 2023-03-30
Payer: MEDICAID

## 2023-03-30 PROCEDURE — 99024 POSTOP FOLLOW-UP VISIT: CPT

## 2023-03-30 PROCEDURE — 73030 X-RAY EXAM OF SHOULDER: CPT | Mod: RT

## 2023-03-30 NOTE — HISTORY OF PRESENT ILLNESS
[de-identified] :  57-year-old woman returns for interval follow-up status post open reduction internal fixation 4 part right proximal humerus fracture October 24, 2022 complicated by avascular necrosis and prominent hardware.  She underwent removal of prominent heart rate January 30, 2023.  Subsequent to that surgery her pain is significantly improved.  She continues with a self-directed exercise program working on shoulder motion at home with her activities of daily living and in swimming pool at the gym.  Occasionally she will has some discomfort for which she takes over-the-counter NSAIDs and or Tylenol.  Denies any fevers or drainage.  Reasonably happy with her result now.

## 2023-03-30 NOTE — IMAGING
[de-identified] :   Delma middle-aged woman sits comfortably my office in no distress.\par \par Physical examination:\par Right shoulder:  Surgical incisions have healed.  No tenderness palpation about surgical site.  Active forward flexion is to 80°.  Abduction is to 45°.  With her shoulder held at 85° of forward flexion external rotation is 50° and internal rotation 60°.  She can touch her belt line posteriorly.  No longer has tenderness with forward flexion of her shoulder.  No axillary lymph nodes.  Swelling is improved.\par \par Radiographs:\par Right shoulder (AP, lateral):  Prominent hardware has been removed.  Remaining hardware remains in place.  Greater tuberosity remains in place is almost complete avascular necrosis and collapse of humeral head.  There are erosive changes in the glenoid from her prominent hardware which is not removed.

## 2023-03-30 NOTE — ASSESSMENT
[FreeTextEntry1] :   A 57-year-old woman status post open reduction internal fixation of a right proximal humerus fracture complicated by avascular necrosis necessitating hardware removal.  Patient seems to doing reasonably well with her self-directed exercise program.  I would like to continue working on stretching out her shoulder and working on her shoulder.  She can use light weights to work on strengthening of her shoulder.  I have her check back status side with her results we can always revisit the discussion a conversion to total shoulder arthroplasty.  All questions were answered to her satisfaction

## 2023-05-30 NOTE — PATIENT PROFILE ADULT - FUNCTIONAL ASSESSMENT - BASIC MOBILITY 3.
4 = No assist / stand by assistance Abbe Flap (Lower To Upper Lip) Text: The defect of the upper lip was assessed and measured.  Given the location and size of the defect, an Abbe flap was deemed most appropriate. Using a sterile surgical marker, an appropriate Abbe flap was measured and drawn on the lower lip. Local anesthesia was then infiltrated. A scalpel was then used to incise the upper lip through and through the skin, vermilion, muscle and mucosa, leaving the flap pedicled on the opposite side.  The flap was then rotated and transferred to the lower lip defect.  The flap was then sutured into place with a three layer technique, closing the orbicularis oris muscle layer with subcutaneous buried sutures, followed by a mucosal layer and an epidermal layer.

## 2023-06-13 ENCOUNTER — APPOINTMENT (OUTPATIENT)
Dept: ORTHOPEDIC SURGERY | Facility: CLINIC | Age: 58
End: 2023-06-13
Payer: MEDICAID

## 2023-06-13 PROCEDURE — 99213 OFFICE O/P EST LOW 20 MIN: CPT

## 2023-06-13 NOTE — HISTORY OF PRESENT ILLNESS
[de-identified] :  57-year-old woman returns status post open reduction internal fixation 4 part proximal humerus fracture October 24, 2022 complicated by avascular necrosis prominent hardware.  She underwent removal of prominent hardware January 30, 2023.  Patient notes that her pain continues to improve and her function continues to improve.  She is in a self-directed exercise program.  She notes the biceps curls every other day actually improved her function and decrease her pain.  She does not routinely take any pain medication.  Denies any fevers or drainage.  No sensory complaints.

## 2023-06-13 NOTE — ASSESSMENT
[FreeTextEntry1] :  57-year-old woman returns 4 months status post removal of prominent hardware for avascular necrosis right humeral head.  She is done quite well despite her injuries and complications.  We talked about exercises and she is going to continue with her self-directed exercise program.  Will have her check in with us in 3-6 months time for repeat evaluation radiographs of her right shoulder.  If she develops any worsening symptoms of pain or problems she is encouraged to come back to us sooner.  All questions were answered to her satisfaction and she agrees with the plan.

## 2023-09-05 ENCOUNTER — APPOINTMENT (OUTPATIENT)
Dept: ORTHOPEDIC SURGERY | Facility: CLINIC | Age: 58
End: 2023-09-05

## 2023-10-13 ENCOUNTER — APPOINTMENT (OUTPATIENT)
Dept: ORTHOPEDIC SURGERY | Facility: CLINIC | Age: 58
End: 2023-10-13

## 2023-11-24 ENCOUNTER — APPOINTMENT (OUTPATIENT)
Dept: ORTHOPEDIC SURGERY | Facility: CLINIC | Age: 58
End: 2023-11-24

## 2024-02-01 ENCOUNTER — APPOINTMENT (OUTPATIENT)
Dept: ORTHOPEDIC SURGERY | Facility: CLINIC | Age: 59
End: 2024-02-01

## 2024-02-09 ENCOUNTER — APPOINTMENT (OUTPATIENT)
Dept: ORTHOPEDIC SURGERY | Facility: CLINIC | Age: 59
End: 2024-02-09
Payer: MEDICAID

## 2024-02-09 DIAGNOSIS — S42.291A OTHER DISPLACED FRACTURE OF UPPER END OF RIGHT HUMERUS, INITIAL ENCOUNTER FOR CLOSED FRACTURE: ICD-10-CM

## 2024-02-09 PROCEDURE — 73030 X-RAY EXAM OF SHOULDER: CPT | Mod: RT

## 2024-02-09 PROCEDURE — 99213 OFFICE O/P EST LOW 20 MIN: CPT

## 2024-02-09 NOTE — HISTORY OF PRESENT ILLNESS
[de-identified] : 58-year-old woman returns for interval follow-up status post open reduction internal fixation four-part right proximal humerus fracture October 17, 2022 complicated by avascular porosis she underwent removal of proximal fixation January 30, 2023.  The patient denies any fevers or drainage.  She does not routinely take any medication for pain.  Still has a restriction in range of motion of her shoulder.  Was seen by her primary medical doctor suggested she follow-up with me to see if she would benefit from more physical therapy.

## 2024-02-09 NOTE — IMAGING
[de-identified] : Pleasant middle-age woman sits and stands comfortably in my office in no distress.    Physical examination: Right shoulder: Well-healed surgical scar.  No undue swelling erythema induration or fluctuance.  No axilla lymph nodes.  Active forward flexion of her shoulder is to 80 degrees.  Abduction 70 degrees.  She is able to range these shoulder without discomfort.  Passively I can forward flex her to about 90 degrees and abduct her in additional 10 degrees to have 80 degrees.  She has normal light sensation axial nerve distribution.  Radiographs: Right shoulder (AP, internal Tatian lateral, Y scapula): Extensive collapse humeral head.  Previous placed screw fixation in the humeral head has been removed.  Erosive changes are noted about the glenohumeral joint consistent with advanced posttraumatic arthritis.

## 2024-02-09 NOTE — REASON FOR VISIT
[FreeTextEntry2] : Follow-up for poor right proximal humerus fracture complicated by avascular necrosis

## 2024-02-09 NOTE — ASSESSMENT
[FreeTextEntry1] : 58-year-old woman with a four-part proximal humerus fracture has gone on to radiographic evidence of posttraumatic arthritis secondary to avascular necrosis.  Offending hardware has been removed.  Functionally she does reasonably well with a forward flexion arc up to about 80 degrees.  She does not seem to be unduly uncomfortable with this arc of motion.  Explained to the patient that if she were to develop significant complaints of pain, she would be a candidate for a reverse total shoulder arthroplasty.  This might marginally improve her range of motion but would improve pain.  Since she does not have so much pain at this time we can hold off on this option.  I do not think further physical therapy would help the patient.  Will have her follow-up for interval check with radiographs in 1 year.  If she develops any worsening symptoms or pain or problems she is invited to come back sooner.  Will CC a copy of this note to her primary medical doctor.

## 2025-02-04 ENCOUNTER — APPOINTMENT (OUTPATIENT)
Dept: ORTHOPEDIC SURGERY | Facility: CLINIC | Age: 60
End: 2025-02-04

## 2025-03-21 NOTE — H&P PST ADULT - ATTENDING PHYSICIAN (PRINT):______________________________ DATE:_______ TIME:_______
[facility] provides services at a reduced cost to those who are determined to be eligible through [facility]’s financial assistance program. Information regarding [facility]’s financial assistance program can be found by going to [email or web address] or by calling [phone]. Statement Selected

## 2025-06-03 ENCOUNTER — APPOINTMENT (OUTPATIENT)
Dept: ORTHOPEDIC SURGERY | Facility: CLINIC | Age: 60
End: 2025-06-03